# Patient Record
Sex: MALE | Race: BLACK OR AFRICAN AMERICAN | Employment: PART TIME | URBAN - METROPOLITAN AREA
[De-identification: names, ages, dates, MRNs, and addresses within clinical notes are randomized per-mention and may not be internally consistent; named-entity substitution may affect disease eponyms.]

---

## 2017-03-07 ENCOUNTER — OFFICE VISIT (OUTPATIENT)
Dept: HEMATOLOGY | Age: 62
End: 2017-03-07

## 2017-03-07 VITALS
WEIGHT: 219 LBS | RESPIRATION RATE: 14 BRPM | TEMPERATURE: 97.9 F | DIASTOLIC BLOOD PRESSURE: 59 MMHG | HEART RATE: 87 BPM | SYSTOLIC BLOOD PRESSURE: 99 MMHG | OXYGEN SATURATION: 97 % | HEIGHT: 75 IN | BODY MASS INDEX: 27.23 KG/M2

## 2017-03-07 DIAGNOSIS — B18.1 CHRONIC HEPATITIS B (HCC): Primary | ICD-10-CM

## 2017-03-07 DIAGNOSIS — B18.1 CHRONIC HEPATITIS B (HCC): ICD-10-CM

## 2017-03-07 NOTE — PROGRESS NOTES
93 Cassie Reeder MD, STAN Acosta NP Gladis Delay, NP        at [address], [address] [address]  22.     [phone]     FAX: [phone]    at [facility], [address] [place] [address]     [phone]     FAX: [phone]       Patient Care Team:  Bubba Almaguer MD as PCP - General (Family Practice)  Marissa Whitign MD (Gastroenterology)      Problem List  Date Reviewed: 8/29/2016          Codes Class Noted    Chronic hepatitis B ([facility] ICD-10-CM: B18.1  ICD-9-CM: 070.32  7/26/2014        Bipolar 1 disorder ([facility] ICD-10-CM: F31.9  ICD-9-CM: 296.7  7/21/2014        Chronic hepatitis C ([facility] ICD-10-CM: B18.2  ICD-9-CM: 070.54  7/21/2014        GERD (gastroesophageal reflux disease) ICD-10-CM: K21.9  ICD-9-CM: 530.81  7/21/2014        Colon polyps ICD-10-CM: K63.5  ICD-9-CM: 211.3  7/21/2014              Ghazal Kuo returns to the [address] for management of Chronic HCV and Chronic HBV. The active problem list, all pertinent past medical history, medications, liver histology, endoscopic studies, radiologic findings and laboratory findings related to the liver disorder were reviewed with the patient. A liver biopsy was performed in 5/2014. This demonstrated no fibrosis. The most recent laboratory studies indicate that the liver transaminases are normal, alkaline phosphatase is normal, tests of hepatic synthetic and metabolic function are normal, and the platelet count is normal.      Mr. Kyra Strong has chronic hepatitis B that is inactive with a low viral load and normal transaminases. Mr. Kyra Strong completed 12 weeks of treatment with Goldie Stage in May 2015. He tolerated treatment well and became HCV RNA undetectable at week eight.   He remained HCV RNA undetectable one year post treatment and is considered cured of HCV. Mr. Eliza Shaver has no physical complaints today. The patient completes all daily activities without any functional limitations. He denies headache, nausea, vomiting, myalgias, arthralgias, fever, abdominal pain, pruritus, icterus, swelling of the abdomen or lower extremities, or hematemesis or hematochezia. ALLERGIES  No Known Allergies    MEDICATIONS  Current Outpatient Prescriptions   Medication Sig    risperiDONE (RISPERDAL M-TABS) 1 mg disintegrating tablet Take 1 mg by mouth two (2) times a day.  lithium carbonate SR (LITHOBID) 300 mg CR tablet Take 300 mg by mouth two (2) times a day.  amitriptyline (ELAVIL) 25 mg tablet Take  by mouth nightly.  omeprazole (PRILOSEC) 40 mg capsule Take 40 mg by mouth daily. No current facility-administered medications for this visit. REVIEW OF SYSTEMS NOT RELATED TO LIVER DISEASE:  Constitution systems: Negative for fever, chills, weight gain, weight loss. Eyes: Negative for diplopia, visual changes, eye pain. ENT: Negative for sore throat, painful swallowing. Respiratory: Negative for cough, hemoptysis, dyspnea. Cardiology: Negative for chest pain, palpitations. GI:  Negative for constipation or diarrhea. : Negative for urinary frequency, dysuria and hematuria. Skin: Negative for rash. Hematology: Negative for easy bruising, bleeding from gums or nose. Musculo-skelatal: Negative for back pain, muscle pain, weakness. Neurologic: Negative for headaches, dizziness, vertigo, memory problems. Psychology: bipolar disease is well controlled. FAMILY HISTORY:  The father  of COPD. The mother  of ovarian cancer. There are no other persons in the family with HCV or liver disease. SOCIAL HISTORY:  The patient is . He is now in a long term relationship. The partner has not been tested for HCV. The patient has 1 child and 8 grandchildren. The patient currently smokes half pack of tobacco daily. The patient has never consumed significant amounts of alcohol. The patient currently works full time . PHYSICAL EXAMINATION:    Visit Vitals    BP 99/59 (BP 1 Location: Right arm, BP Patient Position: Sitting)    Pulse 87    Temp 97.9 °F (36.6 °C) (Tympanic)    Resp 14    Ht 6' 3\" (1.905 m)    Wt 219 lb (99.3 kg)    SpO2 97%    BMI 27.37 kg/m2       General: No acute distress. Eyes: Sclera anicteric. ENT: No oral lesions. Thyroid normal.  Nodes: No adenopathy. Skin: No spider angiomata. No jaundice. No palmar erythema. Respiratory: Lungs clear to auscultation. Cardiovascular: Regular heart rate. No murmurs. No JVD. Abdomen: Soft non-tender. Liver size normal to percussion/palpation. Spleen not palpable. No obvious ascites. Extremities: No edema. No muscle wasting. No gross arthritic changes. Neurologic: Alert and oriented. Cranial nerves grossly intact. No asterixsis. LABORATORY STUDIES:    Lab Results   Component Value Date/Time    WBC 3.7 08/29/2016 03:21 PM    HGB 14.1 08/29/2016 03:21 PM    HCT 43.1 08/29/2016 03:21 PM    PLATELET 653 75/05/6777 03:21 PM    MCV 89.6 08/29/2016 03:21 PM     Lab Results   Component Value Date/Time    Sodium 146 08/29/2016 03:21 PM    Potassium 4.6 08/29/2016 03:21 PM    Chloride 107 08/29/2016 03:21 PM    CO2 32 08/29/2016 03:21 PM    Anion gap 7 08/29/2016 03:21 PM    Glucose 108 08/29/2016 03:21 PM    BUN 11 08/29/2016 03:21 PM    Creatinine 1.24 08/29/2016 03:21 PM    BUN/Creatinine ratio 9 08/29/2016 03:21 PM    GFR est AA >60 08/29/2016 03:21 PM    GFR est non-AA 59 08/29/2016 03:21 PM    Calcium 9.2 08/29/2016 03:21 PM     Lab Results   Component Value Date/Time    ALT (SGPT) 18 08/29/2016 03:21 PM    AST (SGOT) 19 08/29/2016 03:21 PM    Alk. phosphatase 94 08/29/2016 03:21 PM    Bilirubin, direct 0.1 08/29/2016 03:21 PM    Bilirubin, total 0.4 08/29/2016 03:21 PM       HBV  IU    SEROLOGIES:  6/2014. HAV total positive, HBsAntigen positive, anti-HBsurface negative, anti-HCV positive, HCV Geneotype 1, HCV RNA Log 5.5 IU/ml, Ferritin 84, iron saturation 25%,   7/2014. HBsurface antigen positive, anti-HBcore positive, anti-HBsurface negative. LIVER HISTOLOGY:  6/2014. Mild-moderate inflammation with no fibrosis. Biopsy slides not reviewed by MLS. ENDOSCOPIC PROCEDURES:  2/2014. EGD by Dr Jabari Bruno. No varices. Schatzki ring. Normal stomach.  2/2014. Colonoscopy by Dr Jabari Bruno. Tubular adenoma. RADIOLOGY:  12/2014: Abdominal ultrasound. Normal appearing liver. No liver mass/lesion noted. 4/2016: Abdominal ultrasound. Normal appearing liver. No liver mass/lesion noted. 9/2016: Abdominal ultrasound. Normal appearing liver. No liver mass/lesion noted. OTHER TESTING:  Not available or performed    ASSESSMENT AND PLAN:    1. Chronic HCV, genotype 1. The liver transaminases are normal.  Liver function is normal.  The platelet count is normal.      2. HCV treatment. Mr. Davis completed 12 weeks of treatment with Alpesh Samm with good toleration in May 2015. He remained HCV RNA undetectable one year post treatment. CBC, BMP, hepatic panel, and HCV RNA ordered today. 3.  Chronic HBV, inactive. HBV DNA ordered today. His last HBV DNA was 310 IU. 4. The patient was directed to continue all current medications at the current dosages. 5. The patient was counseled regarding alcohol consumption. 6. Vaccination for viral hepatitis A is not required. The patient has serologic evidence of prior exposure or vaccination with immunity. 7. [facility] screening will be performed. Ultrasound and AFP ordered today. [facility] in 6 months. 9. All of the above issues were discussed with the patient. All questions were answered. The patient expressed a clear understanding of the above. 10. Dr. Elena Rose was available during this visit.      [facility] Duck, NP  Liver [place] of [address] [place] [address], [address] [address] Marisabel Hinkle, [address]  [phone]

## 2017-03-07 NOTE — MR AVS SNAPSHOT
Visit Information Date & Time Provider Department Dept. Phone Encounter #  
 3/7/2017  1:30 PM Jennifer Diop NP Liver [place] of [address] [place] [address] Street [id number] Follow-up Instructions Return in about 6 months (around 9/7/2017). Upcoming Health Maintenance Date Due Pneumococcal 19-64 Medium Risk (1 of 1 - PPSV23) 9/18/1974 DTaP/Tdap/Td series (1 - Tdap) 9/18/1976 FOBT Q 1 YEAR AGE 50-75 9/18/2005 ZOSTER VACCINE AGE 60> 9/18/2015 INFLUENZA AGE 9 TO ADULT 8/1/2016 Allergies as of 3/7/2017  Review Complete On: 3/7/2017 By: Kelsea Mantilla NP No Known Allergies Current Immunizations  Never Reviewed No immunizations on file. Not reviewed this visit You Were Diagnosed With   
  
 Codes Comments Chronic hepatitis B ([facility]    -  Primary ICD-10-CM: B18.1 ICD-9-CM: 070.32 Vitals BP Pulse Temp Resp Height(growth percentile) 99/59 (BP 1 Location: Right arm, BP Patient Position: Sitting) 87 97.9 °F (36.6 °C) (Tympanic) 14 6' 3\" (1.905 m) Weight(growth percentile) SpO2 BMI Smoking Status 219 lb (99.3 kg) 97% 27.37 kg/m2 Current Every Day Smoker BMI and BSA Data Body Mass Index Body Surface Area  
 27.37 kg/m 2 2.29 m 2 Preferred Pharmacy Pharmacy Name Phone [address] [place] [address] [phone] Your Updated Medication List  
  
   
This list is accurate as of: 3/7/17  2:09 PM.  Always use your most recent med list.  
  
  
  
  
 amitriptyline 25 mg tablet Commonly known as:  ELAVIL Take  by mouth nightly. lithium carbonate  mg CR tablet Commonly known as:  Kala Van Take 300 mg by mouth two (2) times a day. PriLOSEC 40 mg capsule Generic drug:  omeprazole Take 40 mg by mouth daily. risperiDONE 1 mg disintegrating tablet Commonly known as:  RisperDAL m-tabs Take 1 mg by mouth two (2) times a day. Follow-up Instructions Return in about 6 months (around 9/7/2017). To-Do List   
 03/07/2017 Lab:  AFP WITH AFP-L3%   
  
 03/07/2017 Lab:  CBC WITH AUTOMATED DIFF   
  
 03/07/2017 Lab:  HCV RNA BY RONNIE QL,RFLX TO QT   
  
 03/07/2017 Lab:  HEPATIC FUNCTION PANEL   
  
 03/07/2017 Lab:  HEPATITIS B, QT, PCR   
  
 03/07/2017 Lab:  METABOLIC PANEL, BASIC   
  
 03/07/2017 Imaging:  US [facility] SERVICES! Dear Rudi Kawasaki: Thank you for requesting a [employer] account. Our records indicate that you already have an active [employer] account. You can access your account anytime at [email or web address] Did you know that you can access your hospital and ER discharge instructions at any time in [employer]? You can also review all of your test results from your hospital stay or ER visit. Additional Information If you have questions, please visit the Frequently Asked Questions section of the Pro[employer] website at [email or web address] Remember, ProQu[employer] is NOT to be used for urgent needs. For medical emergencies, dial 911. Now available from your iPhone and Android! Please provide this summary of care documentation to your next provider. Your primary care clinician is listed as Hernán Bautista. If you have any questions after today's visit, please call [phone].

## 2017-03-14 ENCOUNTER — HOSPITAL ENCOUNTER (OUTPATIENT)
Dept: ULTRASOUND IMAGING | Age: 62
Discharge: HOME | End: 2017-03-14
Attending: NURSE PRACTITIONER
Payer: MEDICARE

## 2017-03-14 DIAGNOSIS — B18.1 CHRONIC HEPATITIS B (HCC): ICD-10-CM

## 2017-03-14 PROCEDURE — 76705 ECHO EXAM OF ABDOMEN: CPT

## 2017-03-31 LAB
AFP L3 MFR SERPL: NORMAL % (ref 0–9.9)
AFP SERPL-MCNC: 1.2 NG/ML (ref 0–8)
ALBUMIN SERPL-MCNC: 4.3 G/DL (ref 3.6–4.8)
ALP SERPL-CCNC: 80 IU/L (ref 39–117)
ALT SERPL-CCNC: 11 IU/L (ref 0–44)
AMBIG ABBREV BMP8 DEFAULT, 977205: NORMAL
AMBIG ABBREV HFP7 DEFAULT, 977213: NORMAL
AST SERPL-CCNC: 14 IU/L (ref 0–40)
BASOPHILS # BLD AUTO: 0 X10E3/UL (ref 0–0.2)
BASOPHILS NFR BLD AUTO: 0 %
BILIRUB DIRECT SERPL-MCNC: 0.19 MG/DL (ref 0–0.4)
BILIRUB SERPL-MCNC: 0.6 MG/DL (ref 0–1.2)
BUN SERPL-MCNC: 10 MG/DL (ref 8–27)
BUN/CREAT SERPL: 9 (ref 10–22)
CALCIUM SERPL-MCNC: 9.5 MG/DL (ref 8.6–10.2)
CHLORIDE SERPL-SCNC: 101 MMOL/L (ref 96–106)
CO2 SERPL-SCNC: 27 MMOL/L (ref 18–29)
CREAT SERPL-MCNC: 1.1 MG/DL (ref 0.76–1.27)
EOSINOPHIL # BLD AUTO: 0.1 X10E3/UL (ref 0–0.4)
EOSINOPHIL NFR BLD AUTO: 1 %
ERYTHROCYTE [DISTWIDTH] IN BLOOD BY AUTOMATED COUNT: 14.5 % (ref 12.3–15.4)
GLUCOSE SERPL-MCNC: 104 MG/DL (ref 65–99)
HCT VFR BLD AUTO: 41.2 % (ref 37.5–51)
HCV RNA SERPL QL NAA+PROBE: NEGATIVE
HGB BLD-MCNC: 14.3 G/DL (ref 12.6–17.7)
IMM GRANULOCYTES # BLD: 0 X10E3/UL (ref 0–0.1)
IMM GRANULOCYTES NFR BLD: 0 %
LYMPHOCYTES # BLD AUTO: 1.2 X10E3/UL (ref 0.7–3.1)
LYMPHOCYTES NFR BLD AUTO: 25 %
MCH RBC QN AUTO: 30.3 PG (ref 26.6–33)
MCHC RBC AUTO-ENTMCNC: 34.7 G/DL (ref 31.5–35.7)
MCV RBC AUTO: 87 FL (ref 79–97)
MONOCYTES # BLD AUTO: 0.3 X10E3/UL (ref 0.1–0.9)
MONOCYTES NFR BLD AUTO: 6 %
NEUTROPHILS # BLD AUTO: 3.2 X10E3/UL (ref 1.4–7)
NEUTROPHILS NFR BLD AUTO: 68 %
PLATELET # BLD AUTO: 197 X10E3/UL (ref 150–379)
POTASSIUM SERPL-SCNC: 4.2 MMOL/L (ref 3.5–5.2)
PROT SERPL-MCNC: 6.7 G/DL (ref 6–8.5)
RBC # BLD AUTO: 4.72 X10E6/UL (ref 4.14–5.8)
SODIUM SERPL-SCNC: 141 MMOL/L (ref 134–144)
WBC # BLD AUTO: 4.8 X10E3/UL (ref 3.4–10.8)

## 2017-12-06 ENCOUNTER — OFFICE VISIT (OUTPATIENT)
Dept: HEMATOLOGY | Age: 62
End: 2017-12-06

## 2017-12-06 ENCOUNTER — HOSPITAL ENCOUNTER (OUTPATIENT)
Dept: LAB | Age: 62
Discharge: HOME | End: 2017-12-06
Payer: MEDICARE

## 2017-12-06 VITALS
SYSTOLIC BLOOD PRESSURE: 114 MMHG | OXYGEN SATURATION: 97 % | BODY MASS INDEX: 26.36 KG/M2 | DIASTOLIC BLOOD PRESSURE: 70 MMHG | HEIGHT: 75 IN | WEIGHT: 212 LBS | HEART RATE: 78 BPM | RESPIRATION RATE: 16 BRPM | TEMPERATURE: 96.4 F

## 2017-12-06 DIAGNOSIS — B19.10 HEP B W/O COMA: ICD-10-CM

## 2017-12-06 DIAGNOSIS — B19.10 HEP B W/O COMA: Primary | ICD-10-CM

## 2017-12-06 LAB
ALBUMIN SERPL-MCNC: 4.6 G/DL (ref 3.4–5)
ALBUMIN/GLOB SERPL: 1.5 {RATIO} (ref 0.8–1.7)
ALP SERPL-CCNC: 77 U/L (ref 45–117)
ALT SERPL-CCNC: 23 U/L (ref 16–61)
ANION GAP SERPL CALC-SCNC: 9 MMOL/L (ref 3–18)
AST SERPL-CCNC: 24 U/L (ref 15–37)
BASOPHILS # BLD: 0 K/UL (ref 0–0.06)
BASOPHILS NFR BLD: 0 % (ref 0–2)
BILIRUB DIRECT SERPL-MCNC: 0.2 MG/DL (ref 0–0.2)
BILIRUB SERPL-MCNC: 0.8 MG/DL (ref 0.2–1)
BUN SERPL-MCNC: 16 MG/DL (ref 7–18)
BUN/CREAT SERPL: 12 (ref 12–20)
CALCIUM SERPL-MCNC: 10.4 MG/DL (ref 8.5–10.1)
CHLORIDE SERPL-SCNC: 108 MMOL/L (ref 100–108)
CO2 SERPL-SCNC: 32 MMOL/L (ref 21–32)
CREAT SERPL-MCNC: 1.3 MG/DL (ref 0.6–1.3)
DIFFERENTIAL METHOD BLD: NORMAL
EOSINOPHIL # BLD: 0 K/UL (ref 0–0.4)
EOSINOPHIL NFR BLD: 1 % (ref 0–5)
ERYTHROCYTE [DISTWIDTH] IN BLOOD BY AUTOMATED COUNT: 14.2 % (ref 11.6–14.5)
GLOBULIN SER CALC-MCNC: 3 G/DL (ref 2–4)
GLUCOSE SERPL-MCNC: 100 MG/DL (ref 74–99)
HCT VFR BLD AUTO: 45.8 % (ref 36–48)
HGB BLD-MCNC: 15.4 G/DL (ref 13–16)
LYMPHOCYTES # BLD: 1.5 K/UL (ref 0.9–3.6)
LYMPHOCYTES NFR BLD: 31 % (ref 21–52)
MCH RBC QN AUTO: 30.1 PG (ref 24–34)
MCHC RBC AUTO-ENTMCNC: 33.6 G/DL (ref 31–37)
MCV RBC AUTO: 89.5 FL (ref 74–97)
MONOCYTES # BLD: 0.3 K/UL (ref 0.05–1.2)
MONOCYTES NFR BLD: 6 % (ref 3–10)
NEUTS SEG # BLD: 3 K/UL (ref 1.8–8)
NEUTS SEG NFR BLD: 62 % (ref 40–73)
PLATELET # BLD AUTO: 197 K/UL (ref 135–420)
PMV BLD AUTO: 11.1 FL (ref 9.2–11.8)
POTASSIUM SERPL-SCNC: 4.9 MMOL/L (ref 3.5–5.5)
PROT SERPL-MCNC: 7.6 G/DL (ref 6.4–8.2)
RBC # BLD AUTO: 5.12 M/UL (ref 4.7–5.5)
SODIUM SERPL-SCNC: 149 MMOL/L (ref 136–145)
WBC # BLD AUTO: 4.8 K/UL (ref 4.6–13.2)

## 2017-12-06 PROCEDURE — 80048 BASIC METABOLIC PNL TOTAL CA: CPT | Performed by: NURSE PRACTITIONER

## 2017-12-06 PROCEDURE — 85025 COMPLETE CBC W/AUTO DIFF WBC: CPT | Performed by: NURSE PRACTITIONER

## 2017-12-06 PROCEDURE — 36415 COLL VENOUS BLD VENIPUNCTURE: CPT | Performed by: NURSE PRACTITIONER

## 2017-12-06 PROCEDURE — 87517 HEPATITIS B DNA QUANT: CPT | Performed by: NURSE PRACTITIONER

## 2017-12-06 PROCEDURE — 82107 ALPHA-FETOPROTEIN L3: CPT | Performed by: NURSE PRACTITIONER

## 2017-12-06 PROCEDURE — 80076 HEPATIC FUNCTION PANEL: CPT | Performed by: NURSE PRACTITIONER

## 2017-12-06 NOTE — MR AVS SNAPSHOT
Visit Information Date & Time Provider Department Dept. Phone Encounter #  
 12/6/2017 11:00 AM STAN [address] [place] [address] [phone] Follow-up Instructions Return in about 6 months (around 6/6/2018). Upcoming Health Maintenance Date Due Pneumococcal 19-64 Medium Risk (1 of 1 - PPSV23) 9/18/1974 DTaP/Tdap/Td series (1 - Tdap) 9/18/1976 FOBT Q 1 YEAR AGE 50-75 9/18/2005 ZOSTER VACCINE AGE 60> 7/18/2015 Influenza Age 5 to Adult 8/1/2017 Allergies as of 12/6/2017  Review Complete On: 12/6/2017 By: Ernie Clay No Known Allergies Current Immunizations  Never Reviewed No immunizations on file. Not reviewed this visit You Were Diagnosed With   
  
 Codes Comments Hep B w/o coma    -  Primary ICD-10-CM: B19.10 ICD-9-CM: 070.30 Vitals BP Pulse Temp Resp Height(growth percentile) 114/70 (BP 1 Location: Left arm, BP Patient Position: Sitting) 78 96.4 °F (35.8 °C) (Tympanic) 16 6' 3\" (1.905 m) Weight(growth percentile) SpO2 BMI Smoking Status 212 lb (96.2 kg) 97% 26.5 kg/m2 Current Every Day Smoker Vitals History BMI and BSA Data Body Mass Index Body Surface Area  
 26.5 kg/m 2 2.26 m 2 Preferred Pharmacy Pharmacy Name Phone [address] [facility] Marcela S, [address] [place] [address] [phone] Your Updated Medication List  
  
   
This list is accurate as of: 12/6/17 11:04 AM.  Always use your most recent med list.  
  
  
  
  
 amitriptyline 25 mg tablet Commonly known as:  ELAVIL Take  by mouth nightly. lithium carbonate  mg CR tablet Commonly known as:  Selestino Kelch Take 300 mg by mouth two (2) times a day. PriLOSEC 40 mg capsule Generic drug:  omeprazole Take 40 mg by mouth daily. risperiDONE 1 mg disintegrating tablet Commonly known as:  RisperDAL m-tabs Take 1 mg by mouth two (2) times a day. Follow-up Instructions Return in about 6 months (around 6/6/2018). To-Do List   
 12/06/2017 Lab:  AFP WITH AFP-L3%   
  
 12/06/2017 Lab:  CBC WITH AUTOMATED DIFF   
  
 12/06/2017 Lab:  HEPATIC FUNCTION PANEL   
  
 12/06/2017 Lab:  HEPATITIS B, QT, PCR   
  
 12/06/2017 Lab:  METABOLIC PANEL, BASIC   
  
 12/06/2017 Imaging:  [facility]BD [facility]LASTOGRAPHY Introducing [facility] SERVICES! Dear Beti Wise: Thank you for requesting a [employer] account. Our records indicate that you already have an active [employer] account. You can access your account anytime at [email or web address] Did you know that you can access your hospital and ER discharge instructions at any time in [employer]? You can also review all of your test results from your hospital stay or ER visit. Additional Information If you have questions, please visit the Frequently Asked Questions section of the [employer] website at [email or web address] Remember, SwapMob is NOT to be used for urgent needs. For medical emergencies, dial 911. Now available from your iPhone and Android! Please provide this summary of care documentation to your next provider. Your primary care clinician is listed as Tulio Isaacs. If you have any questions after today's visit, please call [phone].

## 2017-12-06 NOTE — PROGRESS NOTES
[address] Anatoly Uribe MD, Distant, [place]ye [place], [place]       STAN [place], SUZANNA Castano Officer, [facility]   STAN Greene NP        at [address], [address] [address]     [phone]     FAX: [phone]    at [address], [address] [place] [address], [address]     [phone]     FAX: [phone]         Patient Care Team:  Criselda Rachel MD as PCP - General (Family Practice)  Pascual Turcios MD (Gastroenterology)      Problem List  Date Reviewed: 12/6/2017          Codes Class Noted    Chronic hepatitis B ([facility] ICD-10-CM: B18.1  ICD-9-CM: 070.32  7/26/2014        Bipolar 1 disorder ([facility] ICD-10-CM: F31.9  ICD-9-CM: 296.7  7/21/2014        Chronic hepatitis C ([facility] ICD-10-CM: B18.2  ICD-9-CM: 070.54  7/21/2014        GERD (gastroesophageal reflux disease) ICD-10-CM: K21.9  ICD-9-CM: 530.81  7/21/2014        Colon polyps ICD-10-CM: K63.5  ICD-9-CM: 211.3  7/21/2014              Manju Heard [facility] the [facility] for management of Chronic HCV and Chronic HBV. The active problem list, all pertinent past medical history, medications, liver histology, endoscopic studies, radiologic findings and laboratory findings related to the liver disorder were reviewed with the patient. A liver biopsy was performed in 5/2014. This demonstrated no fibrosis. The most recent laboratory studies indicate that the liver transaminases are normal, alkaline phosphatase is normal, tests of hepatic synthetic and metabolic function are normal, and the platelet count is normal.      Mr. Jaleesa Muniz has chronic hepatitis B that is inactive with a low viral load and normal transaminases. Mr. Jaleesa Muniz completed 12 weeks of treatment with Daleen Calico in May 2015.   He tolerated treatment well and became HCV RNA undetectable at week eight. He remained HCV RNA undetectable one year post treatment. The HCV has been treated and cured. The patient has no complaints which can be attributed to liver disease. The patient completes all daily activities without any functional limitations. The patient has not experienced fatigue, fevers, chills, shortness of breath, chest pain, pain in the right side over the liver, diffuse abdominal pain, nausea, vomiting, constipation, diarrhrea, dry eyes, dry mouth, arthralgias, myalgias, yellowing of the eyes or skin, itching, dark urine, problems concentrating, swelling of the abdomen, swelling of the lower extremities, hematemesis, or hematochezia. ALLERGIES  No Known Allergies    MEDICATIONS  Current Outpatient Prescriptions   Medication Sig    risperiDONE (RISPERDAL M-TABS) 1 mg disintegrating tablet Take 1 mg by mouth two (2) times a day.  lithium carbonate SR (LITHOBID) 300 mg CR tablet Take 300 mg by mouth two (2) times a day.  amitriptyline (ELAVIL) 25 mg tablet Take  by mouth nightly.  omeprazole (PRILOSEC) 40 mg capsule Take 40 mg by mouth daily. No current facility-administered medications for this visit. REVIEW OF SYSTEMS NOT RELATED TO LIVER DISEASE:  Constitution systems: Negative for fever, chills, weight gain, weight loss. Eyes: Negative for diplopia, visual changes, eye pain. ENT: Negative for sore throat, painful swallowing. Respiratory: Negative for cough, hemoptysis, dyspnea. Cardiology: Negative for chest pain, palpitations. GI:  Negative for constipation or diarrhea. : Negative for urinary frequency, dysuria and hematuria. Skin: Negative for rash. Hematology: Negative for easy bruising, bleeding from gums or nose. Musculo-skelatal: Negative for back pain, muscle pain, weakness. Neurologic: Negative for headaches, dizziness, vertigo, memory problems. Psychology: bipolar disease is well controlled. FAMILY HISTORY:  The father  of COPD. The mother  of ovarian cancer. There are no other persons in the family with HCV or liver disease. SOCIAL HISTORY:  The patient is . He is now in a long term relationship. The partner has not been tested for HCV. The patient has 1 child and 8 grandchildren. The patient currently smokes half pack of tobacco daily. The patient has never consumed significant amounts of alcohol. The patient currently works full time . PHYSICAL EXAMINATION:    Visit Vitals    /70 (BP 1 Location: Left arm, BP Patient Position: Sitting)    Pulse 78    Temp 96.4 °F (35.8 °C) (Tympanic)    Resp 16    Ht 6' 3\" (1.905 m)    Wt 212 lb (96.2 kg)    SpO2 97%    BMI 26.5 kg/m2       General: No acute distress. Eyes: Sclera anicteric. ENT: No oral lesions. Thyroid normal.  Nodes: No adenopathy. Skin: No spider angiomata. No jaundice. No palmar erythema. Respiratory: Lungs clear to auscultation. Cardiovascular: Regular heart rate. No murmurs. No JVD. Abdomen: Soft non-tender. Liver size normal to percussion/palpation. Spleen not palpable. No obvious ascites. Extremities: No edema. No muscle wasting. No gross arthritic changes. Neurologic: Alert and oriented. Cranial nerves grossly intact. No asterixsis.     LABORATORY STUDIES:  Liver [place] of [address] [place] [address] 2017 3/28/2017   WBC 4.6 - 13.2 K/uL 4.8 4.8   ANC 1.8 - 8.0 K/UL 3.0 3.2   HGB 13.0 - 16.0 g/dL 15.4 14.3    - 420 K/uL 197 197   INR 0.8 - 1.2       AST 15 - 37 U/L 24 14   ALT 16 - 61 U/L 23 11   Alk Phos 45 - 117 U/L 77 80   Bili, Total 0.2 - 1.0 MG/DL 0.8 0.6   Bili, Direct 0.0 - 0.2 MG/DL 0.2 0.19   Albumin 3.4 - 5.0 g/dL 4.6 4.3   BUN 7.0 - 18 MG/DL 16 10   Creat 0.6 - 1.3 MG/DL 1.30 1.10   Na 136 - 145 mmol/L 149 (H) 141   K 3.5 - 5.5 mmol/L 4.9 4.2   Cl 100 - 108 mmol/L 108 101   CO2 21 - 32 mmol/L 32 27   Glucose 74 - 99 mg/dL 100 (H) 104 (H)     Liver [facility] Units 8/29/2016   WBC 4.6 - 13.2 K/uL 3.7 (L)   ANC 1.8 - 8.0 K/UL 2.2   HGB 13.0 - 16.0 g/dL 14.1    - 420 K/uL 190   INR 0.8 - 1.2   1.0   AST 15 - 37 U/L 19   ALT 16 - 61 U/L 18   Alk Phos 45 - 117 U/L 94   Bili, Total 0.2 - 1.0 MG/DL 0.4   Bili, Direct 0.0 - 0.2 MG/DL 0.1   Albumin 3.4 - 5.0 g/dL 4.1   BUN 7.0 - 18 MG/DL 11   Creat 0.6 - 1.3 MG/DL 1.24   Na 136 - 145 mmol/L 146 (H)   K 3.5 - 5.5 mmol/L 4.6   Cl 100 - 108 mmol/L 107   CO2 21 - 32 mmol/L 32   Glucose 74 - 99 mg/dL 108 (H)       SEROLOGIES:  6/2014. HAV total positive, HBsAntigen positive, anti-HBsurface negative, anti-HCV positive, HCV Geneotype 1, HCV RNA Log 5.5 IU/ml, Ferritin 84, iron saturation 25%,   7/2014. HBsurface antigen positive, anti-HBcore positive, anti-HBsurface negative. LIVER HISTOLOGY:  6/2014. Mild-moderate inflammation with no fibrosis. Biopsy slides not reviewed by MLS. ENDOSCOPIC PROCEDURES:  2/2014. EGD by Dr Shanna Obando. No varices. Schatzki ring. Normal stomach.  2/2014. Colonoscopy by Dr Shanna Obando. Tubular adenoma. RADIOLOGY:  12/2014: Abdominal ultrasound. Normal appearing liver. No liver mass/lesion noted. 4/2016: Abdominal ultrasound. Normal appearing liver. No liver mass/lesion noted. 9/2016: Abdominal ultrasound. Normal appearing liver. No liver mass/lesion noted. 03/2017. Ultrasound of the liver. Mild hepatomegaly and increased hepatic echogenicity, most suggestive of hepatic steatosis. No suspicious hepatic lesion. OTHER TESTING:  Not available or performed    ASSESSMENT AND PLAN:  Chronic HCV, genotype 1. The most recent laboratory studies indicate that the liver transaminases are normal, alkaline phosphatase is normal, tests of hepatic synthetic and metabolic function are normal, and the platelet count is normal.  Will perform laboratory testing to monitor liver function and degree of liver injury.  This will include hepatic panel, a CBC w/ diff, a BMP, an HBV DNA, an AFP-L3%, and an HCV RNA. HCV treatment. Mr. Shola Mason completed 12 weeks of treatment with Ella Siddiqui in May 2015. He remained HCV RNA undetectable one year post treatment. The HCV has been treated and cured. Inactive chronic HBV. No treatment is required at this time. Inactive disease does not need treatment. [address] screening. Recent ultrasound did not suggest emerging HCC. I emphasized to the patient that he need to have ultrasounds completed every 6 months. Early detection of HCC is the key to survival.  Even though he has inactive disease, he is still at increased risk for developing [address] The need to perform an assessment of liver fibrosis was discussed with the patient. Elastography  can assess liver fibrosis and determine if a patient has advanced fibrosis or cirrhosis without the need for liver biopsy. This can also be performed with ultrasound. This was ordered today. The patient was directed to continue all current medications at the current dosages. The patient was counseled regarding alcohol consumption. Vaccination for viral hepatitis A is not required. The patient has serologic evidence of prior exposure or vaccination with immunity. [address] [place] [address] in 6 months. All of the above issues were discussed with the patient. All questions were answered. The patient expressed a clear understanding of the above. Dr. Meet Neff was available during this visit.      Mirian Toscano NP  Liver [place] of [address] [place] [address], [address] [place] [address], [address]  [phone]

## 2017-12-06 NOTE — PROGRESS NOTES
1. Have you been to the ER, urgent care clinic since your last visit? Hospitalized since your last visit? No    2. Have you seen or consulted any other health care providers outside of the [address] [place] [address] since your last visit? Include any pap smears or colon screening.  No

## 2017-12-07 LAB
HBV DNA SERPL NAA+PROBE-ACNC: 20 IU/ML
HBV DNA SERPL NAA+PROBE-LOG IU: 1.3 LOG10IU/ML
TEST INFORMATION: NORMAL

## 2017-12-08 LAB
AFP L3 MFR SERPL: 24.5 % (ref 0–9.9)
AFP SERPL-MCNC: 2.7 NG/ML (ref 0–8)

## 2017-12-11 ENCOUNTER — TELEPHONE (OUTPATIENT)
Dept: HEMATOLOGY | Age: 62
End: 2017-12-11

## 2017-12-23 ENCOUNTER — HOSPITAL ENCOUNTER (OUTPATIENT)
Dept: ULTRASOUND IMAGING | Age: 62
Discharge: HOME | End: 2017-12-23
Payer: MEDICARE

## 2017-12-23 DIAGNOSIS — B19.10 HEP B W/O COMA: ICD-10-CM

## 2017-12-23 PROCEDURE — 0346T US ABD LTD W ELASTOGRAPHY: CPT

## 2018-01-09 NOTE — PROGRESS NOTES
Please let the patient know that the ultrasound was unremarkable. No liver masses. Elastography suggests some moderate hepatic fibrosis and fatty liver. Encourage weight loss. Thank you.

## 2018-07-31 ENCOUNTER — HOSPITAL ENCOUNTER (OUTPATIENT)
Dept: LAB | Age: 63
Discharge: HOME | End: 2018-07-31
Payer: MEDICARE

## 2018-07-31 ENCOUNTER — OFFICE VISIT (OUTPATIENT)
Dept: HEMATOLOGY | Age: 63
End: 2018-07-31

## 2018-07-31 VITALS
OXYGEN SATURATION: 98 % | RESPIRATION RATE: 16 BRPM | SYSTOLIC BLOOD PRESSURE: 123 MMHG | WEIGHT: 199 LBS | HEART RATE: 82 BPM | TEMPERATURE: 97.9 F | HEIGHT: 75 IN | DIASTOLIC BLOOD PRESSURE: 68 MMHG | BODY MASS INDEX: 24.74 KG/M2

## 2018-07-31 DIAGNOSIS — B19.10 HEP B W/O COMA: ICD-10-CM

## 2018-07-31 DIAGNOSIS — B19.10 HEP B W/O COMA: Primary | ICD-10-CM

## 2018-07-31 LAB
ALBUMIN SERPL-MCNC: 4.2 G/DL (ref 3.4–5)
ALBUMIN/GLOB SERPL: 1.4 {RATIO} (ref 0.8–1.7)
ALP SERPL-CCNC: 74 U/L (ref 45–117)
ALT SERPL-CCNC: 17 U/L (ref 16–61)
ANION GAP SERPL CALC-SCNC: 4 MMOL/L (ref 3–18)
AST SERPL-CCNC: 22 U/L (ref 15–37)
BASOPHILS # BLD: 0 K/UL (ref 0–0.1)
BASOPHILS NFR BLD: 0 % (ref 0–2)
BILIRUB DIRECT SERPL-MCNC: 0.2 MG/DL (ref 0–0.2)
BILIRUB SERPL-MCNC: 0.5 MG/DL (ref 0.2–1)
BUN SERPL-MCNC: 14 MG/DL (ref 7–18)
BUN/CREAT SERPL: 12 (ref 12–20)
CALCIUM SERPL-MCNC: 9.6 MG/DL (ref 8.5–10.1)
CHLORIDE SERPL-SCNC: 106 MMOL/L (ref 100–108)
CO2 SERPL-SCNC: 32 MMOL/L (ref 21–32)
CREAT SERPL-MCNC: 1.2 MG/DL (ref 0.6–1.3)
DIFFERENTIAL METHOD BLD: ABNORMAL
EOSINOPHIL # BLD: 0 K/UL (ref 0–0.4)
EOSINOPHIL NFR BLD: 0 % (ref 0–5)
ERYTHROCYTE [DISTWIDTH] IN BLOOD BY AUTOMATED COUNT: 13.9 % (ref 11.6–14.5)
GLOBULIN SER CALC-MCNC: 3 G/DL (ref 2–4)
GLUCOSE SERPL-MCNC: 99 MG/DL (ref 74–99)
HCT VFR BLD AUTO: 43.5 % (ref 36–48)
HGB BLD-MCNC: 14.5 G/DL (ref 13–16)
LYMPHOCYTES # BLD: 1.2 K/UL (ref 0.9–3.6)
LYMPHOCYTES NFR BLD: 32 % (ref 21–52)
MCH RBC QN AUTO: 30.2 PG (ref 24–34)
MCHC RBC AUTO-ENTMCNC: 33.3 G/DL (ref 31–37)
MCV RBC AUTO: 90.6 FL (ref 74–97)
MONOCYTES # BLD: 0.2 K/UL (ref 0.05–1.2)
MONOCYTES NFR BLD: 6 % (ref 3–10)
NEUTS SEG # BLD: 2.3 K/UL (ref 1.8–8)
NEUTS SEG NFR BLD: 62 % (ref 40–73)
PLATELET # BLD AUTO: 159 K/UL (ref 135–420)
PMV BLD AUTO: 11.4 FL (ref 9.2–11.8)
POTASSIUM SERPL-SCNC: 4.7 MMOL/L (ref 3.5–5.5)
PROT SERPL-MCNC: 7.2 G/DL (ref 6.4–8.2)
RBC # BLD AUTO: 4.8 M/UL (ref 4.7–5.5)
SODIUM SERPL-SCNC: 142 MMOL/L (ref 136–145)
WBC # BLD AUTO: 3.8 K/UL (ref 4.6–13.2)

## 2018-07-31 PROCEDURE — 80048 BASIC METABOLIC PNL TOTAL CA: CPT | Performed by: NURSE PRACTITIONER

## 2018-07-31 PROCEDURE — 80076 HEPATIC FUNCTION PANEL: CPT | Performed by: NURSE PRACTITIONER

## 2018-07-31 PROCEDURE — 85025 COMPLETE CBC W/AUTO DIFF WBC: CPT | Performed by: NURSE PRACTITIONER

## 2018-07-31 PROCEDURE — 82107 ALPHA-FETOPROTEIN L3: CPT | Performed by: NURSE PRACTITIONER

## 2018-07-31 PROCEDURE — 87521 HEPATITIS C PROBE&RVRS TRNSC: CPT | Performed by: NURSE PRACTITIONER

## 2018-07-31 PROCEDURE — 87517 HEPATITIS B DNA QUANT: CPT | Performed by: NURSE PRACTITIONER

## 2018-07-31 PROCEDURE — 36415 COLL VENOUS BLD VENIPUNCTURE: CPT | Performed by: NURSE PRACTITIONER

## 2018-07-31 NOTE — PROGRESS NOTES
[address] Anatoly Uribe MD, [address], [place], [place]       Yasmeen Gonzales, SUZANNA Feliz, DERIC-BC   Maine Age, NP    Kristian Bowles NP        at [place] [id number], [address] [address]     [phone]     FAX: [phone]    at [address], [address]     [phone]     FAX: [phone]         Patient Care Team:  Karen Landon MD as PCP - General (Family Practice)  Eric Hudson MD (Gastroenterology)      Problem List  Date Reviewed: 7/31/2018          Codes Class Noted    Chronic hepatitis B ([facility] ICD-10-CM: B18.1  ICD-9-CM: 070.32  7/26/2014        Bipolar 1 disorder ([facility] ICD-10-CM: F31.9  ICD-9-CM: 296.7  7/21/2014        Chronic hepatitis C ([facility] ICD-10-CM: B18.2  ICD-9-CM: 070.54  7/21/2014        GERD (gastroesophageal reflux disease) ICD-10-CM: K21.9  ICD-9-CM: 530.81  7/21/2014        Colon polyps ICD-10-CM: K63.5  ICD-9-CM: 211.3  7/21/2014              Miko Brandon [facility] the [facility] for management of Chronic HCV and Chronic HBV. The active problem list, all pertinent past medical history, medications, liver histology, endoscopic studies, radiologic findings and laboratory findings related to the liver disorder were reviewed with the patient. A liver biopsy was performed in 5/2014. This demonstrated no fibrosis. The most recent laboratory studies indicate that the liver transaminases are normal, alkaline phosphatase is normal, tests of hepatic synthetic and metabolic function are normal, and the platelet count is normal.      Mr. Shahida Guerrero has chronic hepatitis B that is inactive with a low viral load and normal transaminases. Mr. Shahida Guerrero completed 12 weeks of treatment with Clydie Seamen in May 2015.   He tolerated treatment well and became HCV RNA undetectable at week eight. He remained HCV RNA undetectable one year post treatment. The HCV has been treated and cured. The patient has no complaints which can be attributed to liver disease. The patient completes all daily activities without any functional limitations. The patient has not experienced fatigue, fevers, chills, shortness of breath, chest pain, pain in the right side over the liver, diffuse abdominal pain, nausea, vomiting, constipation, diarrhrea, dry eyes, dry mouth, arthralgias, myalgias, yellowing of the eyes or skin, itching, dark urine, problems concentrating, swelling of the abdomen, swelling of the lower extremities, hematemesis, or hematochezia. ALLERGIES  No Known Allergies    MEDICATIONS  Current Outpatient Prescriptions   Medication Sig    risperiDONE (RISPERDAL M-TABS) 1 mg disintegrating tablet Take 1 mg by mouth two (2) times a day.  lithium carbonate SR (LITHOBID) 300 mg CR tablet Take 300 mg by mouth two (2) times a day.  amitriptyline (ELAVIL) 25 mg tablet Take  by mouth nightly.  omeprazole (PRILOSEC) 40 mg capsule Take 40 mg by mouth daily. No current facility-administered medications for this visit. REVIEW OF SYSTEMS NOT RELATED TO LIVER DISEASE:  Constitution systems: Negative for fever, chills, weight gain, weight loss. Eyes: Negative for diplopia, visual changes, eye pain. ENT: Negative for sore throat, painful swallowing. Respiratory: Negative for cough, hemoptysis, dyspnea. Cardiology: Negative for chest pain, palpitations. GI:  Negative for constipation or diarrhea. : Negative for urinary frequency, dysuria and hematuria. Skin: Negative for rash. Hematology: Negative for easy bruising, bleeding from gums or nose. Musculo-skelatal: Negative for back pain, muscle pain, weakness. Neurologic: Negative for headaches, dizziness, vertigo, memory problems. Psychology: bipolar disease is well controlled. FAMILY HISTORY:  The father  of COPD. The mother  of ovarian cancer. There are no other persons in the family with HCV or liver disease. SOCIAL HISTORY:  The patient is . He is now in a long term relationship. The partner has not been tested for HCV. The patient has 1 child and 8 grandchildren. The patient currently smokes half pack of tobacco daily. The patient has never consumed significant amounts of alcohol. The patient currently works full time . PHYSICAL EXAMINATION:  Visit Vitals    /68 (BP 1 Location: Left arm, BP Patient Position: Sitting)    Pulse 82    Temp 97.9 °F (36.6 °C) (Tympanic)    Resp 16    Ht 6' 3\" (1.905 m)    Wt 199 lb (90.3 kg)    SpO2 98%    BMI 24.87 kg/m2       General: No acute distress. Eyes: Sclera anicteric. ENT: No oral lesions. Thyroid normal.  Nodes: No adenopathy. Skin: No spider angiomata. No jaundice. No palmar erythema. Respiratory: Lungs clear to auscultation. Cardiovascular: Regular heart rate. No murmurs. No JVD. Abdomen: Soft non-tender. Liver size normal to percussion/palpation. Spleen not palpable. No obvious ascites. Extremities: No edema. No muscle wasting. No gross arthritic changes. Neurologic: Alert and oriented. Cranial nerves grossly intact. No asterixsis.     LABORATORY STUDIES:  [facility] [place] [facility] [address] Units 2018   WBC 4.6 - 13.2 K/uL 3.8 (L) 4.8   ANC 1.8 - 8.0 K/UL 2.3 3.0   HGB 13.0 - 16.0 g/dL 14.5 15.4    - 420 K/uL 159 197   INR 0.8 - 1.2       AST 15 - 37 U/L 22 24   ALT 16 - 61 U/L 17 23   Alk Phos 45 - 117 U/L 74 77   Bili, Total 0.2 - 1.0 MG/DL 0.5 0.8   Bili, Direct 0.0 - 0.2 MG/DL 0.2 0.2   Albumin 3.4 - 5.0 g/dL 4.2 4.6   BUN 7.0 - 18 MG/DL 14 16   Creat 0.6 - 1.3 MG/DL 1.20 1.30   Na 136 - 145 mmol/L 142 149 (H)   K 3.5 - 5.5 mmol/L 4.7 4.9   Cl 100 - 108 mmol/L 106 108   CO2 21 - 32 mmol/L 32 32   Glucose 74 - 99 mg/dL 99 100 (H)     [facility] [place] [facility] [address] [place] [address] Ref Rng & Units 3/28/2017   WBC 4.6 - 13.2 K/uL 4.8   ANC 1.8 - 8.0 K/UL 3.2   HGB 13.0 - 16.0 g/dL 14.3    - 420 K/uL 197   INR 0.8 - 1.2      AST 15 - 37 U/L 14   ALT 16 - 61 U/L 11   Alk Phos 45 - 117 U/L 80   Bili, Total 0.2 - 1.0 MG/DL 0.6   Bili, Direct 0.0 - 0.2 MG/DL 0.19   Albumin 3.4 - 5.0 g/dL 4.3   BUN 7.0 - 18 MG/DL 10   Creat 0.6 - 1.3 MG/DL 1.10   Na 136 - 145 mmol/L 141   K 3.5 - 5.5 mmol/L 4.2   Cl 100 - 108 mmol/L 101   CO2 21 - 32 mmol/L 27   Glucose 74 - 99 mg/dL 104 (H)     Cancer Screening Latest Ref Rng & Units 7/31/2018 12/6/2017 3/28/2017   AFP, Serum 0.0 - 8.0 ng/mL 1.0 2.7 1.2   AFP-L3% 0.0 - 9.9 % Comment 24.5 (H) Comment     SEROLOGIES:  6/2014. HAV total positive, HBsAntigen positive, anti-HBsurface negative, anti-HCV positive, HCV Geneotype 1, HCV RNA Log 5.5 IU/ml, Ferritin 84, iron saturation 25%,   7/2014. HBsurface antigen positive, anti-HBcore positive, anti-HBsurface negative. LIVER HISTOLOGY:  6/2014. Mild-moderate inflammation with no fibrosis. Biopsy slides not reviewed by MLS. 12/2017. TRANSIENT HEPATIC ELASTOGRAPHY:   E Range: 6.70-11.42 kPa  E Mean: 9.28 kPa  E Median: 9.79 kPa  E Std: 2.31 kPa    ENDOSCOPIC PROCEDURES:  2/2014. EGD by Dr Henna Guerrero. No varices. Schatzki ring. Normal stomach.  2/2014. Colonoscopy by Dr Henna Guerrero. Tubular adenoma. RADIOLOGY:  12/2014: Abdominal ultrasound. Normal appearing liver. No liver mass/lesion noted. 4/2016: Abdominal ultrasound. Normal appearing liver. No liver mass/lesion noted. 9/2016: Abdominal ultrasound. Normal appearing liver. No liver mass/lesion noted. 03/2017. Ultrasound of the liver. Mild hepatomegaly and increased hepatic echogenicity, most suggestive of hepatic steatosis. No suspicious hepatic lesion. 12/2017. Ultrasound of the liver. The liver has mildly increased echotexture diffusely.  Hepatomegaly is present with estimated sagittal dimension of the right hepatic lobe measuring 18.1 cm. No focal hepatic lesions are seen. OTHER TESTING:  Not available or performed    ASSESSMENT AND PLAN:  Inactive Hepatitis B. The most recent laboratory studies indicate that the liver transaminases are normal, alkaline phosphatase is normal, tests of hepatic synthetic and metabolic function are normal, and the platelet count is normal.  Will perform laboratory testing to monitor liver function and degree of liver injury. This will include hepatic panel, a CBC w/ diff, a BMP, an HBV DNA, an AFP-L3%, and an HCV RNA. HCV treatment. Genotype. Mr. Willard Gale completed 12 weeks of treatment with Fernando Liter in May 2015. He remained HCV RNA undetectable one year post treatment. The HCV has been treated and cured. Inactive chronic HBV. No treatment is required at this time. Inactive disease does not need treatment. [address] screening. Recent ultrasound did not suggest emerging HCC. I emphasized to the patient that he need to have ultrasounds completed every 6 months. Early detection of HCC is the key to survival.  Even though he has inactive disease, he is still at increased risk for developing [address] Repeat ultrasound was ordered today. The patient was directed to continue all current medications at the current dosages. The patient was counseled regarding alcohol consumption. Vaccination for viral hepatitis A is not required. The patient has serologic evidence of prior exposure or vaccination with immunity. [facility] 87 in 6 months. All of the above issues were discussed with the patient. All questions were answered. The patient expressed a clear understanding of the above.      Kiesha White NP  [facility] [place] of [address] [place] [address], [address] [place] [address] Christine Clark, [address]  [phone]

## 2018-07-31 NOTE — PROGRESS NOTES
Miko Brandon is a 58 y.o. male      1. Have you been to the ER, urgent care clinic or hospitalized since your last visit? NO.     2. Have you seen or consulted any other health care providers outside of the [address] [place] [address] since your last visit (Include any pap smears or colon screening)?  NO          Learning Assessment 1/22/2015   PRIMARY LEARNER Patient   PRIMARY LANGUAGE ENGLISH   LEARNER PREFERENCE PRIMARY LISTENING   ANSWERED BY patient

## 2018-07-31 NOTE — MR AVS SNAPSHOT
[address] [place] [address] [address] [place] [address] 
[phone] Patient: Kevin Faye MRN: [id number] SFY:[id number] Visit Information Date & Time Provider Department Dept. Phone Encounter #  
 7/31/2018 11:30 AM STAN [address] of  [address] [id number] Follow-up Instructions Return in about 6 months (around 1/31/2019). Upcoming Health Maintenance Date Due Pneumococcal 19-64 Medium Risk (1 of 1 - PPSV23) 9/18/1974 DTaP/Tdap/Td series (1 - Tdap) 9/18/1976 FOBT Q 1 YEAR AGE 50-75 9/18/2005 ZOSTER VACCINE AGE 60> 7/18/2015 MEDICARE YEARLY EXAM 3/14/2018 Influenza Age 5 to Adult 8/1/2018 Allergies as of 7/31/2018  Review Complete On: 7/31/2018 By: Richardson Castanon No Known Allergies Current Immunizations  Never Reviewed No immunizations on file. Not reviewed this visit You Were Diagnosed With   
  
 Codes Comments Hep B w/o coma    -  Primary ICD-10-CM: B19.10 ICD-9-CM: 070.30 Vitals BP Pulse Temp Resp Height(growth percentile) 123/68 (BP 1 Location: Left arm, BP Patient Position: Sitting) 82 97.9 °F (36.6 °C) (Tympanic) 16 6' 3\" (1.905 m) Weight(growth percentile) SpO2 BMI Smoking Status 199 lb (90.3 kg) 98% 24.87 kg/m2 Current Every Day Smoker BMI and BSA Data Body Mass Index Body Surface Area  
 24.87 kg/m 2 2.19 m 2 Preferred Pharmacy Pharmacy Name Phone  [facility], [address] [place] [address] [phone] Your Updated Medication List  
  
   
This list is accurate as of 7/31/18 11:56 AM.  Always use your most recent med list.  
  
  
  
  
 amitriptyline 25 mg tablet Commonly known as:  ELAVIL Take  by mouth nightly. lithium carbonate  mg CR tablet Commonly known as:  [place] Mis Take 300 mg by mouth two (2) times a day. PriLOSEC 40 mg capsule Generic drug:  omeprazole Take 40 mg by mouth daily. risperiDONE 1 mg disintegrating tablet Commonly known as:  RisperDAL m-tabs Take 1 mg by mouth two (2) times a day. Follow-up Instructions Return in about 6 months (around 1/31/2019). To-Do List   
 07/31/2018 Lab:  AFP WITH AFP-L3%   
  
 07/31/2018 Lab:  CBC WITH AUTOMATED DIFF   
  
 07/31/2018 Lab:  HCV RNA BY RONNIE QL,RFLX TO QT   
  
 07/31/2018 Lab:  HEPATIC FUNCTION PANEL   
  
 07/31/2018 Lab:  HEPATITIS B, QT, PCR   
  
 07/31/2018 Lab:  METABOLIC PANEL, BASIC   
  
 07/31/2018 Imaging:  [facility]! Dear Gray Simmonds: Thank you for requesting a [employer] account. Our records indicate that you already have an active [employer] account. You can access your account anytime at [email or web address]. [employer] Did you know that you can access your hospital and ER discharge instructions at any time in [employer]? You can also review all of your test results from your hospital stay or ER visit. Additional Information If you have questions, please visit the Frequently Asked Questions section of the [employer] website at [email or web address]/. Remember, TapCanvas is NOT to be used for urgent needs. For medical emergencies, dial 911. Now available from your iPhone and Android! Please provide this summary of care documentation to your next provider. Your primary care clinician is listed as Michelle Rachel. If you have any questions after today's visit, please call [phone].

## 2018-08-01 LAB
AFP L3 MFR SERPL: NORMAL % (ref 0–9.9)
AFP SERPL-MCNC: 1 NG/ML (ref 0–8)
HBV DNA SERPL NAA+PROBE-ACNC: <10 IU/ML
HBV DNA SERPL NAA+PROBE-LOG IU: NORMAL LOG10 IU/ML
TEST INFORMATION: NORMAL

## 2018-08-03 LAB
HCV RNA SERPL NAA+PROBE-LOG IU: NOT DETECTED HCV LOG 10IU/ML
HCV RNA SERPL PROBE AMP-ACNC: NOT DETECTED HCVIU/ML
HCV RNA SERPL QL NAA+PROBE: NOT DETECTED

## 2018-08-16 ENCOUNTER — HOSPITAL ENCOUNTER (OUTPATIENT)
Dept: ULTRASOUND IMAGING | Age: 63
Discharge: HOME | End: 2018-08-16
Payer: MEDICARE

## 2018-08-16 DIAGNOSIS — B19.10 HEP B W/O COMA: ICD-10-CM

## 2018-08-16 PROCEDURE — 76705 ECHO EXAM OF ABDOMEN: CPT

## 2018-08-20 NOTE — PROGRESS NOTES
Please let him know that there is nothing suspicious on his ultrasound. No hepatic masses. Thank you.

## 2018-08-28 ENCOUNTER — OFFICE VISIT (OUTPATIENT)
Dept: FAMILY MEDICINE CLINIC | Age: 63
End: 2018-08-28

## 2018-08-28 VITALS
TEMPERATURE: 98.5 F | BODY MASS INDEX: 24.37 KG/M2 | DIASTOLIC BLOOD PRESSURE: 60 MMHG | HEIGHT: 75 IN | WEIGHT: 196 LBS | SYSTOLIC BLOOD PRESSURE: 110 MMHG | RESPIRATION RATE: 16 BRPM | HEART RATE: 85 BPM | OXYGEN SATURATION: 99 %

## 2018-08-28 DIAGNOSIS — R42 DIZZINESS: Primary | ICD-10-CM

## 2018-08-28 DIAGNOSIS — Z00.00 MEDICARE ANNUAL WELLNESS VISIT, INITIAL: ICD-10-CM

## 2018-08-28 RX ORDER — MECLIZINE HYDROCHLORIDE 25 MG/1
25 TABLET ORAL
Qty: 30 TAB | Refills: 0 | Status: SHIPPED | OUTPATIENT
Start: 2018-08-28 | End: 2018-09-07

## 2018-08-28 RX ORDER — BENZTROPINE MESYLATE 0.5 MG/1
0.5 TABLET ORAL 2 TIMES DAILY
COMMUNITY
End: 2019-05-23

## 2018-08-28 NOTE — MR AVS SNAPSHOT
303 [facility] 
 
 
 120 [facility] 114 [facility] [address] 
[phone] Patient: Yamini Devlin MRN: [id number] PGJ:5/95/3107 Visit Information Date & Time Provider Department Dept. Phone Encounter #  
 8/28/2018  8:15 AM CAREY Whitman [facility] [place] [phone] [id number] Your Appointments 1/29/2019 11:00 AM  
[place] [address] [facility] Appt Note: 6mnth f/u  
 [address], [place] 313 [facility] 322 [facility]  
  
   
 [address] [facility] [address], [address] [place] [address] Upcoming Health Maintenance Date Due Pneumococcal 19-64 Medium Risk (1 of 1 - PPSV23) 9/18/1974 DTaP/Tdap/Td series (1 - Tdap) 9/18/1976 FOBT Q 1 YEAR AGE 50-75 9/18/2005 ZOSTER VACCINE AGE 60> 7/18/2015 MEDICARE YEARLY EXAM 3/14/2018 Influenza Age 5 to Adult 8/1/2018 Allergies as of 8/28/2018  Review Complete On: 8/28/2018 By: Erin Espinoza LPN No Known Allergies Current Immunizations  Never Reviewed No immunizations on file. Not reviewed this visit You Were Diagnosed With   
  
 Codes Comments Dizziness    -  Primary ICD-10-CM: B58 ICD-9-CM: 780.4 Medicare annual wellness visit, initial     ICD-10-CM: Z00.00 ICD-9-CM: V70.0 Vitals BP Pulse Temp Resp Height(growth percentile) Weight(growth percentile) 110/60 (BP 1 Location: Left arm, BP Patient Position: Sitting) 85 98.5 °F (36.9 °C) (Oral) 16 6' 3\" (1.905 m) 196 lb (88.9 kg) SpO2 BMI Smoking Status 99% 24.5 kg/m2 Current Every Day Smoker Vitals History BMI and BSA Data Body Mass Index Body Surface Area 24.5 kg/m 2 2.17 m 2 Preferred Pharmacy Pharmacy Name Phone  [facility] [phone] Your Updated Medication List  
  
   
This list is accurate as of 8/28/18  8:50 AM.  Always use your most recent med list.  
  
  
  
  
 amitriptyline 25 mg tablet Commonly known as:  ELAVIL Take  by mouth nightly. benztropine 0.5 mg tablet Commonly known as:  COGENTIN Take 0.5 mg by mouth two (2) times a day. lithium carbonate  mg CR tablet Commonly known as:  Judeth Joss Take 300 mg by mouth two (2) times a day. meclizine 25 mg tablet Commonly known as:  ANTIVERT Take 1 Tab by mouth three (3) times daily as needed for up to 10 days. risperiDONE 1 mg disintegrating tablet Commonly known as:  RisperDAL m-tabs Take 1 mg by mouth two (2) times a day. Prescriptions Sent to Pharmacy Refills  
 meclizine (ANTIVERT) 25 mg tablet 0 Sig: Take 1 Tab by mouth three (3) times daily as needed for up to 10 days. Class: Normal  
 Pharmacy: [address] #: [phone] Route: Oral  
  
Patient Instructions Medicare Wellness Visit, Male The best way to live healthy is to have a lifestyle where you eat a well-balanced diet, exercise regularly, limit alcohol use, and quit all forms of tobacco/nicotine, if applicable. Regular preventive services are another way to keep healthy. Preventive services (vaccines, screening tests, monitoring & exams) can help personalize your care plan, which helps you manage your own care. Screening tests can find health problems at the earliest stages, when they are easiest to treat. [facility] follows the current, evidence-based guidelines published by the [facility] States Lonnie Otis ([facility]) when recommending preventive services for our patients. Because we follow these guidelines, sometimes recommendations change over time as research supports it.  (For example, a prostate screening blood test is no longer routinely recommended for men with no symptoms.) Of course, you and your doctor may decide to screen more often for some diseases, based on your risk and co-morbidities (chronic disease you are already diagnosed with). Preventive services for you include: - Medicare offers their members a free annual wellness visit, which is time for you and your primary care provider to discuss and plan for your preventive service needs. Take advantage of this benefit every year! 
-All adults over age 72 should receive the recommended pneumonia vaccines. Current USPSTF guidelines recommend a series of two vaccines for the best pneumonia protection.  
-All adults should have a flu vaccine yearly and an ECG. All adults age 61 and older should receive a shingles vaccine once in their lifetime.   
-All adults age 38-68 who are overweight should have a diabetes screening test once every three years.  
-Other screening tests & preventive services for persons with diabetes include: an eye exam to screen for diabetic retinopathy, a kidney function test, a foot exam, and stricter control over your cholesterol.  
-Cardiovascular screening for adults with routine risk involves an electrocardiogram (ECG) at intervals determined by the provider.  
-Colorectal cancer screening should be done for adults age 54-65 with no increased risk factors for colorectal cancer. There are a number of acceptable methods of screening for this type of cancer. Each test has its own benefits and drawbacks. Discuss with your provider what is most appropriate for you during your annual wellness visit. The different tests include: colonoscopy (considered the best screening method), a fecal occult blood test, a fecal DNA test, and sigmoidoscopy. 
-All adults born between [facility] should be screened once for Hepatitis C. 
-An Abdominal Aortic Aneurysm (AAA) Screening is recommended for men age 73-68 who has ever smoked in their lifetime. Here is a list of your current Health Maintenance items (your personalized list of preventive services) with a due date: 
Health Maintenance Due Topic Date Due  Pneumococcal Vaccine (1 of 1 - PPSV23) 09/18/1974  
 DTaP/Tdap/Td  (1 - Tdap) 09/18/1976  Stool testing for trace blood  09/18/2005  Shingles Vaccine  07/18/2015 24 \[facility]\"" Annual Well Visit  03/14/2018  Flu Vaccine  08/01/2018 Introducing [facility]! Dear Consuelo Aguirre: Thank you for requesting a Opeepl account. Our records indicate that you already have an active Opeepl account. You can access your account anytime at [email or web address]. [email or web address] [facility] [employer] Did you know that you can access your hospital and ER discharge instructions at any time in O[employer]pl? You can also review all of your test results from your hospital stay or ER visit. Additional Information If you have questions, please visit the Frequently Asked Questions section of the [employer] website at [email or web address] Remember, O[employer] is NOT to be used for urgent needs. For medical emergencies, dial 911. Now available from your iPhone and Android! Please provide this summary of care documentation to your next provider. Your primary care clinician is listed as Criselda Rachel. If you have any questions after today's visit, please call [phone].

## 2018-08-28 NOTE — PROGRESS NOTES
This is an Initial Medicare Annual Wellness Exam (AWV) (Performed 12 months after IPPE or effective date of Medicare Part B enrollment, Once in a lifetime)    I have reviewed the patient's medical history in detail and updated the computerized patient record. History     Past Medical History:   Diagnosis Date    Bipolar 1 disorder ([facility]     Insomnia       Past Surgical History:   Procedure Laterality Date    HX COLONOSCOPY       Current Outpatient Prescriptions   Medication Sig Dispense Refill    benztropine (COGENTIN) 0.5 mg tablet Take 0.5 mg by mouth two (2) times a day.  meclizine (ANTIVERT) 25 mg tablet Take 1 Tab by mouth three (3) times daily as needed for up to 10 days. 30 Tab 0    risperiDONE (RISPERDAL M-TABS) 1 mg disintegrating tablet Take 1 mg by mouth two (2) times a day.  lithium carbonate SR (LITHOBID) 300 mg CR tablet Take 300 mg by mouth two (2) times a day.  amitriptyline (ELAVIL) 25 mg tablet Take  by mouth nightly. No Known Allergies  Family History   Problem Relation Age of Onset   24 [facility] Mother     Other Father      Worked in a cotton plant and  from lung complications      Social History   Substance Use Topics    Smoking status: Current Every Day Smoker     Packs/day: 0.50     Years: 12.00     Types: Cigarettes    Smokeless tobacco: Never Used    Alcohol use No     Patient Active Problem List   Diagnosis Code    Bipolar 1 disorder (HCC) F31.9    Chronic hepatitis C ([facility] B18.2    GERD (gastroesophageal reflux disease) K21.9    Colon polyps K63.5    Chronic hepatitis B ([facility] B18.1       Depression Risk Factor Screening:     PHQ over the last two weeks 2018   Little interest or pleasure in doing things Several days   Feeling down, depressed, irritable, or hopeless Nearly every day   Total Score PHQ 2 4     Alcohol Risk Factor Screening: You do not drink alcohol or very rarely.     Functional Ability and Level of Safety:     Hearing Loss  Hearing is good. Activities of Daily Living  The home contains: no safety equipment. Patient does total self care    Fall Risk  Fall Risk Assessment, last 12 mths 12/6/2017   Able to walk? Yes   Fall in past 12 months? No       Abuse Screen  Patient is not abused    Cognitive Screening   Evaluation of Cognitive Function:  Has your family/caregiver stated any concerns about your memory: no  Normal, Animal Naming test    Patient Care Team   Patient Care Team:  Wong Flores MD as PCP - General (Family Practice)  Radha Barr MD (Gastroenterology)    Assessment/Plan   Education and counseling provided:  Are appropriate based on today's review and evaluation    Diagnoses and all orders for this visit:    1.  Medicare annual wellness visit, initial         Health Maintenance Due   Topic Date Due    Pneumococcal 19-64 Medium Risk (1 of 1 - PPSV23) 09/18/1974    DTaP/Tdap/Td series (1 - Tdap) 09/18/1976    FOBT Q 1 YEAR AGE 50-75  09/18/2005    ZOSTER VACCINE AGE 60>  07/18/2015    MEDICARE YEARLY EXAM  03/14/2018    Influenza Age 9 to Adult  08/01/2018

## 2018-08-28 NOTE — PROGRESS NOTES
HPI  This is a 60-year-old male who presents to center with concern of dizziness. It began 3-4 days ago. First 2 days he was getting dizzy every time she stood up or if he turned his head. The episodes would only last a minute or less. He denies any head or neck injury. Denies any headache or visual disturbance associated with it. Yesterday and today the episodes have not occurred as long as he stands up and moves slowly. Denies this ever happening in the past.      Past Medical History:   Diagnosis Date    Bipolar 1 disorder ([facility] [address]     Insomnia      . Current Outpatient Prescriptions on File Prior to Visit   Medication Sig Dispense Refill    risperiDONE (RISPERDAL M-TABS) 1 mg disintegrating tablet Take 1 mg by mouth two (2) times a day.  lithium carbonate SR (LITHOBID) 300 mg CR tablet Take 300 mg by mouth two (2) times a day.  amitriptyline (ELAVIL) 25 mg tablet Take  by mouth nightly. No current facility-administered medications on file prior to visit. ROS  Constitutional: Denies fever, chills, body aches or fatigue  Neuro: Denies headache or sensory disturbance  Eyes: Denies visual disturbance  Cardio: Denies chest pain or palpitations  Lungs: Denies shortness of breath  Musculoskeletal: Denies extremity weakness      Objective  Visit Vitals    /60 (BP 1 Location: Left arm, BP Patient Position: Sitting)    Pulse 85    Temp 98.5 °F (36.9 °C) (Oral)    Resp 16    Ht 6' 3\" (1.905 m)    Wt 196 lb (88.9 kg)    SpO2 99%    BMI 24.5 kg/m2       Physical Exam  General: Alert and oriented, well-appearing, no distress  Neuro: Alert oriented ×3, behavior and speech normal  ENT: TMs pearly gray without retraction or bulging  Eyes: PERRLA, EOMs intact  Cardio: Heart regular rate rhythm, no murmur  Lungs: Clear to auscultation bilaterally, equal breath sounds throughout    Diagnoses and all orders for this visit:    1. Dizziness    2.  Medicare annual wellness visit, initial    Other orders  -     meclizine (ANTIVERT) 25 mg tablet; Take 1 Tab by mouth three (3) times daily as needed for up to 10 days. Patient reassured that there is nothing abnormal on exam.  He is started on meclizine and cough for possible drowsy effect of it. As far as a note, there is no interaction between that medication his other medications. I advised him to verify that with pharmacy when he goes to pick the prescription up. Patient is to return to center if no improvement in 2-3 days and instructed to go to the emergency department if symptoms worsen. Questions answered and patient verbalized understanding and agreed with plan.     Ag Peña, PA

## 2018-08-28 NOTE — PATIENT INSTRUCTIONS
Medicare Wellness Visit, Male    The best way to live healthy is to have a lifestyle where you eat a well-balanced diet, exercise regularly, limit alcohol use, and quit all forms of tobacco/nicotine, if applicable. Regular preventive services are another way to keep healthy. Preventive services (vaccines, screening tests, monitoring & exams) can help personalize your care plan, which helps you manage your own care. Screening tests can find health problems at the earliest stages, when they are easiest to treat. [address] [facility] follows the current, evidence-based guidelines published by the [facility] Otis ([facility]) when recommending preventive services for our patients. Because we follow these guidelines, sometimes recommendations change over time as research supports it. (For example, a prostate screening blood test is no longer routinely recommended for men with no symptoms.)  Of course, you and your doctor may decide to screen more often for some diseases, based on your risk and co-morbidities (chronic disease you are already diagnosed with). Preventive services for you include:  - Medicare offers their members a free annual wellness visit, which is time for you and your primary care provider to discuss and plan for your preventive service needs. Take advantage of this benefit every year!  -All adults over age 72 should receive the recommended pneumonia vaccines. Current USPSTF guidelines recommend a series of two vaccines for the best pneumonia protection.   -All adults should have a flu vaccine yearly and an ECG.  All adults age 61 and older should receive a shingles vaccine once in their lifetime.    -All adults age 38-68 who are overweight should have a diabetes screening test once every three years.   -Other screening tests & preventive services for persons with diabetes include: an eye exam to screen for diabetic retinopathy, a kidney function test, a foot exam, and stricter control over your cholesterol.   -Cardiovascular screening for adults with routine risk involves an electrocardiogram (ECG) at intervals determined by the provider.   -Colorectal cancer screening should be done for adults age 54-65 with no increased risk factors for colorectal cancer. There are a number of acceptable methods of screening for this type of cancer. Each test has its own benefits and drawbacks. Discuss with your provider what is most appropriate for you during your annual wellness visit. The different tests include: colonoscopy (considered the best screening method), a fecal occult blood test, a fecal DNA test, and sigmoidoscopy.  -All adults born between [facility] should be screened once for Hepatitis C.  -An Abdominal Aortic Aneurysm (AAA) Screening is recommended for men age 73-68 who has ever smoked in their lifetime.      Here is a list of your current Health Maintenance items (your personalized list of preventive services) with a due date:  Health Maintenance Due   Topic Date Due    Pneumococcal Vaccine (1 of 1 - PPSV23) 09/18/1974    DTaP/Tdap/Td  (1 - Tdap) 09/18/1976    Stool testing for trace blood  09/18/2005    Shingles Vaccine  07/18/2015    Annual Well Visit  03/14/2018    Flu Vaccine  08/01/2018

## 2018-09-17 ENCOUNTER — PATIENT MESSAGE (OUTPATIENT)
Dept: HEMATOLOGY | Age: 63
End: 2018-09-17

## 2019-05-23 ENCOUNTER — HOSPITAL ENCOUNTER (OUTPATIENT)
Dept: LAB | Age: 64
Discharge: HOME | End: 2019-05-23
Payer: MEDICARE

## 2019-05-23 ENCOUNTER — OFFICE VISIT (OUTPATIENT)
Dept: HEMATOLOGY | Age: 64
End: 2019-05-23

## 2019-05-23 VITALS
WEIGHT: 192.38 LBS | OXYGEN SATURATION: 97 % | DIASTOLIC BLOOD PRESSURE: 81 MMHG | HEIGHT: 75 IN | TEMPERATURE: 96 F | BODY MASS INDEX: 23.92 KG/M2 | SYSTOLIC BLOOD PRESSURE: 134 MMHG | HEART RATE: 63 BPM

## 2019-05-23 DIAGNOSIS — B19.10 HEP B W/O COMA: Primary | ICD-10-CM

## 2019-05-23 DIAGNOSIS — B19.10 HEP B W/O COMA: ICD-10-CM

## 2019-05-23 LAB
ALBUMIN SERPL-MCNC: 4.4 G/DL (ref 3.4–5)
ALBUMIN/GLOB SERPL: 1.5 {RATIO} (ref 0.8–1.7)
ALP SERPL-CCNC: 76 U/L (ref 45–117)
ALT SERPL-CCNC: 17 U/L (ref 16–61)
ANION GAP SERPL CALC-SCNC: 6 MMOL/L (ref 3–18)
AST SERPL-CCNC: 24 U/L (ref 15–37)
BASOPHILS # BLD: 0 K/UL (ref 0–0.1)
BASOPHILS NFR BLD: 0 % (ref 0–2)
BILIRUB DIRECT SERPL-MCNC: 0.3 MG/DL (ref 0–0.2)
BILIRUB SERPL-MCNC: 0.8 MG/DL (ref 0.2–1)
BUN SERPL-MCNC: 17 MG/DL (ref 7–18)
BUN/CREAT SERPL: 14 (ref 12–20)
CALCIUM SERPL-MCNC: 9.7 MG/DL (ref 8.5–10.1)
CHLORIDE SERPL-SCNC: 106 MMOL/L (ref 100–108)
CO2 SERPL-SCNC: 31 MMOL/L (ref 21–32)
CREAT SERPL-MCNC: 1.22 MG/DL (ref 0.6–1.3)
DIFFERENTIAL METHOD BLD: ABNORMAL
EOSINOPHIL # BLD: 0 K/UL (ref 0–0.4)
EOSINOPHIL NFR BLD: 0 % (ref 0–5)
ERYTHROCYTE [DISTWIDTH] IN BLOOD BY AUTOMATED COUNT: 14 % (ref 11.6–14.5)
GLOBULIN SER CALC-MCNC: 3 G/DL (ref 2–4)
GLUCOSE SERPL-MCNC: 85 MG/DL (ref 74–99)
HCT VFR BLD AUTO: 43.6 % (ref 36–48)
HGB BLD-MCNC: 14.5 G/DL (ref 13–16)
INR PPP: 1.1 (ref 0.8–1.2)
LYMPHOCYTES # BLD: 1.6 K/UL (ref 0.9–3.6)
LYMPHOCYTES NFR BLD: 36 % (ref 21–52)
MCH RBC QN AUTO: 30.1 PG (ref 24–34)
MCHC RBC AUTO-ENTMCNC: 33.3 G/DL (ref 31–37)
MCV RBC AUTO: 90.5 FL (ref 74–97)
MONOCYTES # BLD: 0.3 K/UL (ref 0.05–1.2)
MONOCYTES NFR BLD: 7 % (ref 3–10)
NEUTS SEG # BLD: 2.5 K/UL (ref 1.8–8)
NEUTS SEG NFR BLD: 57 % (ref 40–73)
PLATELET # BLD AUTO: 166 K/UL (ref 135–420)
PMV BLD AUTO: 11.2 FL (ref 9.2–11.8)
POTASSIUM SERPL-SCNC: 4.9 MMOL/L (ref 3.5–5.5)
PROT SERPL-MCNC: 7.4 G/DL (ref 6.4–8.2)
PROTHROMBIN TIME: 13.9 SEC (ref 11.5–15.2)
RBC # BLD AUTO: 4.82 M/UL (ref 4.7–5.5)
SODIUM SERPL-SCNC: 143 MMOL/L (ref 136–145)
WBC # BLD AUTO: 4.5 K/UL (ref 4.6–13.2)

## 2019-05-23 PROCEDURE — 87517 HEPATITIS B DNA QUANT: CPT

## 2019-05-23 PROCEDURE — 82107 ALPHA-FETOPROTEIN L3: CPT

## 2019-05-23 PROCEDURE — 85025 COMPLETE CBC W/AUTO DIFF WBC: CPT

## 2019-05-23 PROCEDURE — 80048 BASIC METABOLIC PNL TOTAL CA: CPT

## 2019-05-23 PROCEDURE — 87521 HEPATITIS C PROBE&RVRS TRNSC: CPT

## 2019-05-23 PROCEDURE — 36415 COLL VENOUS BLD VENIPUNCTURE: CPT

## 2019-05-23 PROCEDURE — 80076 HEPATIC FUNCTION PANEL: CPT

## 2019-05-23 PROCEDURE — 85610 PROTHROMBIN TIME: CPT

## 2019-05-23 RX ORDER — VALBENAZINE 80 MG/1
80 CAPSULE ORAL DAILY
COMMUNITY
Start: 2019-05-09 | End: 2022-08-22 | Stop reason: ALTCHOICE

## 2019-05-23 NOTE — PROGRESS NOTES
[facility], [place], [address], [employer], [place]      SUZANNA Kirkland, [facility]     April Amber Apotne, STAN Munoz NP [address]    at [facility]    [facility], [address] [place] [address], [facility].    [phone]    FAX: [address] [place] [address] Avenue    at [address], [address], [address]    [phone]    [address] [place] [address] Street: [phone]       Patient Care Team:  Monie Quintanilla MD (Gastroenterology)      Problem List  Date Reviewed: 7/31/2018          Codes Class Noted    Chronic hepatitis B ([facility] [address] ICD-10-CM: B18.1  ICD-9-CM: 070.32  7/26/2014        Bipolar 1 disorder ([facility] [address] ICD-10-CM: F31.9  ICD-9-CM: 296.7  7/21/2014        Chronic hepatitis C ([facility] [address] ICD-10-CM: B18.2  ICD-9-CM: 070.54  7/21/2014        GERD (gastroesophageal reflux disease) ICD-10-CM: K21.9  ICD-9-CM: 530.81  7/21/2014        Colon polyps ICD-10-CM: K63.5  ICD-9-CM: 211.3  7/21/2014              Trish Salgado returns to the [address] for management of Chronic HCV and Chronic HBV. The active problem list, all pertinent past medical history, medications, liver histology, endoscopic studies, radiologic findings and laboratory findings related to the liver disorder were reviewed with the patient. A liver biopsy was performed in 5/2014. This demonstrated no fibrosis. The most recent laboratory studies indicate that the liver transaminases are normal, alkaline phosphatase is normal, tests of hepatic synthetic and metabolic function are normal, and the platelet count is normal.       [facility] has chronic hepatitis B that is inactive with a low viral load and normal transaminases.  [facility] completed 12 weeks of treatment with Celina Darrell in May 2015. He tolerated treatment well and became HCV RNA undetectable at week eight. He remained HCV RNA undetectable one year post treatment. The HCV has been treated and cured. The patient has no complaints which can be attributed to liver disease. The patient completes all daily activities without any functional limitations. The patient has not experienced fatigue, fevers, chills, shortness of breath, chest pain, pain in the right side over the liver, diffuse abdominal pain, nausea, vomiting, constipation, diarrhrea, dry eyes, dry mouth, arthralgias, myalgias, yellowing of the eyes or skin, itching, dark urine, problems concentrating, swelling of the abdomen, swelling of the lower extremities, hematemesis, or hematochezia. Since the last office appointment the patient has:  Had no changes in the liver disease. ALLERGIES  No Known Allergies    MEDICATIONS  Current Outpatient Medications   Medication Sig    risperiDONE (RISPERDAL M-TABS) 1 mg disintegrating tablet Take 1 mg by mouth two (2) times a day.  lithium carbonate SR (LITHOBID) 300 mg CR tablet Take 300 mg by mouth two (2) times a day.  amitriptyline (ELAVIL) 25 mg tablet Take  by mouth nightly.  INGREZZA 80 mg cap Take 80 mg by mouth daily. No current facility-administered medications for this visit. REVIEW OF SYSTEMS NOT RELATED TO LIVER DISEASE:  Constitution systems: Negative for fever, chills, weight gain, weight loss. Eyes: Negative for diplopia, visual changes, eye pain. ENT: Negative for sore throat, painful swallowing. Respiratory: Negative for cough, hemoptysis, dyspnea. Cardiology: Negative for chest pain, palpitations. GI:  Negative for constipation or diarrhea. : Negative for urinary frequency, dysuria and hematuria. Skin: Negative for rash. Hematology: Negative for easy bruising, bleeding from gums or nose. Musculo-skelatal: Negative for back pain, muscle pain, weakness.   Neurologic: Negative for headaches, dizziness, vertigo, memory problems. Psychology: bipolar disease is well controlled. FAMILY HISTORY:  The father  of COPD. The mother  of ovarian cancer. There are no other persons in the family with HCV or HBV or liver disease. SOCIAL HISTORY:  The patient is . He is now in a long term relationship. The partner has not been tested for HCV. The patient has 1 child and 8 grandchildren. The patient currently smokes half pack of tobacco daily. The patient has never consumed significant amounts of alcohol. The patient currently works part time . PHYSICAL EXAMINATION:  Visit Vitals  /81   Pulse 63   Temp 96 °F (35.6 °C) (Tympanic)   Ht 6' 3\" (1.905 m)   Wt 192 lb 6 oz (87.3 kg)   SpO2 97%   BMI 24.05 kg/m²       General: No acute distress. Eyes: Sclera anicteric. ENT: No oral lesions. Thyroid normal.  Nodes: No adenopathy. Skin: No spider angiomata. No jaundice. No palmar erythema. Respiratory: Lungs clear to auscultation. Cardiovascular: Regular heart rate. No murmurs. No JVD. Abdomen: Soft non-tender. Liver size normal to percussion/palpation. Spleen not palpable. No obvious ascites. Extremities: No edema. No muscle wasting. No gross arthritic changes. Neurologic: Alert and oriented. Cranial nerves grossly intact. No asterixsis.     LABORATORY STUDIES:  [facility] [place] [address] Units 2019   WBC 4.6 - 13.2 K/uL 4.5 (L) 3.8 (L)   ANC 1.8 - 8.0 K/UL 2.5 2.3   HGB 13.0 - 16.0 g/dL 14.5 14.5    - 420 K/uL 166 159   INR 0.8 - 1.2   1.1    AST 15 - 37 U/L 24 22   ALT 16 - 61 U/L 17 17   Alk Phos 45 - 117 U/L 76 74   Bili, Total 0.2 - 1.0 MG/DL 0.8 0.5   Bili, Direct 0.0 - 0.2 MG/DL 0.3 (H) 0.2   Albumin 3.4 - 5.0 g/dL 4.4 4.2   BUN 7.0 - 18 MG/DL 17 14   Creat 0.6 - 1.3 MG/DL 1.22 1.20   Na 136 - 145 mmol/L 143 142   K 3.5 - 5.5 mmol/L 4.9 4.7   Cl 100 - 108 mmol/L 106 106 CO2 21 - 32 mmol/L 31 32   Glucose 74 - 99 mg/dL 85 99     Cancer Screening Latest Ref Rng & Units 5/23/2019 7/31/2018 12/6/2017   AFP, Serum 0.0 - 8.0 ng/mL 1.0 1.0 2.7   AFP-L3% 0.0 - 9.9 % Comment Comment 24.5 (H)     SEROLOGIES:  6/2014. HAV total positive, HBsAntigen positive, anti-HBsurface negative, anti-HCV positive, HCV Geneotype 1, HCV RNA Log 5.5 IU/ml, Ferritin 84, iron saturation 25%,   7/2014. HBsurface antigen positive, anti-HBcore positive, anti-HBsurface negative. LIVER HISTOLOGY:  6/2014. Mild-moderate inflammation with no fibrosis. Biopsy slides not reviewed by MLS. 12/2017. TRANSIENT HEPATIC ELASTOGRAPHY:   E Range: 6.70-11.42 kPa  E Mean: 9.28 kPa  E Median: 9.79 kPa  E Std: 2.31 kPa    ENDOSCOPIC PROCEDURES:  2/2014. EGD by Dr Yudy Gonzalez. No varices. Schatzki ring. Normal stomach.  2/2014. Colonoscopy by Dr Yudy Gonzalez. Tubular adenoma. RADIOLOGY:  12/2014: Abdominal ultrasound. Normal appearing liver. No liver mass/lesion noted. 4/2016: Abdominal ultrasound. Normal appearing liver. No liver mass/lesion noted. 9/2016: Abdominal ultrasound. Normal appearing liver. No liver mass/lesion noted. 03/2017. Ultrasound of the liver. Mild hepatomegaly and increased hepatic echogenicity, most suggestive of hepatic steatosis. No suspicious hepatic lesion. 12/2017. Ultrasound of the liver. The liver has mildly increased echotexture diffusely. Hepatomegaly is present with estimated sagittal dimension of the right hepatic lobe measuring 18.1 cm. No focal hepatic lesions are seen. 08/2018. Ultrasound of the liver. Stable hepatomegaly. Stable increased hepatic echotexture, nonspecific and although commonly seen with hepatic steatosis, correlates with history of hepatocellular dysfunction and hepatitis. No focal hepatic lesion is seen. OTHER TESTING:  Not available or performed    ASSESSMENT AND PLAN:  Inactive Hepatitis B.  The most recent laboratory studies indicate that the liver transaminases are normal, alkaline phosphatase is normal, tests of hepatic synthetic and metabolic function are normal, and the platelet count is normal.  Will perform laboratory testing to monitor liver function and degree of liver injury. This will include hepatic panel, a CBC w/ diff, a BMP, an HBV DNA, an AFP-L3%, and an HCV RNA. HCV treatment. Genotype 1. Mr. Haroldo Dey completed 12 weeks of treatment with Isaiah Brunner in May 2015. He remained HCV RNA undetectable one year post treatment. The HCV has been treated and cured. Inactive chronic HBV. No treatment is required at this time. Inactive disease does not need treatment. [address] screening. Recent ultrasound did not suggest emerging HCC. I emphasized to the patient that he need to have ultrasounds completed every 6 months. Early detection of HCC is the key to survival.  Even though he has inactive disease, he is still at increased risk for developing [address] Repeat ultrasound was ordered today. The patient was directed to continue all current medications at the current dosages. The patient was counseled regarding alcohol consumption. Vaccination for viral hepatitis A is not required. The patient has serologic evidence of prior exposure or vaccination with immunity. [facility] in 4 months. All of the above issues were discussed with the patient. All questions were answered. The patient expressed a clear understanding of the above.      Enedina Parkinson NP  [facility] [place] of [address] [place] [address] WEST, [address] [place] [address], [facility] [address]  [phone]

## 2019-05-24 LAB
AFP L3 MFR SERPL: NORMAL % (ref 0–9.9)
AFP SERPL-MCNC: 1 NG/ML (ref 0–8)
HBV DNA SERPL NAA+PROBE-ACNC: NORMAL IU/ML
HBV DNA SERPL NAA+PROBE-LOG IU: NORMAL LOG10 IU/ML
HCV RNA SERPL NAA+PROBE-LOG IU: NOT DETECTED HCV LOG 10IU/ML
HCV RNA SERPL PROBE AMP-ACNC: NOT DETECTED HCVIU/ML
HCV RNA SERPL QL NAA+PROBE: NOT DETECTED
TEST INFORMATION: NORMAL

## 2019-05-28 ENCOUNTER — TELEPHONE (OUTPATIENT)
Dept: HEMATOLOGY | Age: 64
End: 2019-05-28

## 2019-09-19 ENCOUNTER — OFFICE VISIT (OUTPATIENT)
Dept: HEMATOLOGY | Age: 64
End: 2019-09-19

## 2019-09-19 ENCOUNTER — HOSPITAL ENCOUNTER (OUTPATIENT)
Dept: LAB | Age: 64
Discharge: HOME | End: 2019-09-19
Payer: MEDICARE

## 2019-09-19 ENCOUNTER — HOSPITAL ENCOUNTER (OUTPATIENT)
Dept: ULTRASOUND IMAGING | Age: 64
Discharge: HOME | End: 2019-09-19
Payer: MEDICARE

## 2019-09-19 VITALS
HEART RATE: 59 BPM | HEIGHT: 75 IN | OXYGEN SATURATION: 99 % | TEMPERATURE: 97.4 F | DIASTOLIC BLOOD PRESSURE: 65 MMHG | BODY MASS INDEX: 23.13 KG/M2 | RESPIRATION RATE: 16 BRPM | WEIGHT: 186 LBS | SYSTOLIC BLOOD PRESSURE: 121 MMHG

## 2019-09-19 DIAGNOSIS — B19.10 HEP B W/O COMA: Primary | ICD-10-CM

## 2019-09-19 DIAGNOSIS — B19.10 HEP B W/O COMA: ICD-10-CM

## 2019-09-19 DIAGNOSIS — R76.8 HCV ANTIBODY POSITIVE: ICD-10-CM

## 2019-09-19 LAB
ALBUMIN SERPL-MCNC: 4.1 G/DL (ref 3.4–5)
ALBUMIN/GLOB SERPL: 1.4 {RATIO} (ref 0.8–1.7)
ALP SERPL-CCNC: 70 U/L (ref 45–117)
ALT SERPL-CCNC: 16 U/L (ref 16–61)
ANION GAP SERPL CALC-SCNC: 4 MMOL/L (ref 3–18)
AST SERPL-CCNC: 13 U/L (ref 10–38)
BASOPHILS # BLD: 0 K/UL (ref 0–0.1)
BASOPHILS NFR BLD: 0 % (ref 0–2)
BILIRUB DIRECT SERPL-MCNC: 0.2 MG/DL (ref 0–0.2)
BILIRUB SERPL-MCNC: 0.6 MG/DL (ref 0.2–1)
BUN SERPL-MCNC: 14 MG/DL (ref 7–18)
BUN/CREAT SERPL: 13 (ref 12–20)
CALCIUM SERPL-MCNC: 9.3 MG/DL (ref 8.5–10.1)
CHLORIDE SERPL-SCNC: 107 MMOL/L (ref 100–111)
CO2 SERPL-SCNC: 31 MMOL/L (ref 21–32)
CREAT SERPL-MCNC: 1.11 MG/DL (ref 0.6–1.3)
DIFFERENTIAL METHOD BLD: ABNORMAL
EOSINOPHIL # BLD: 0 K/UL (ref 0–0.4)
EOSINOPHIL NFR BLD: 1 % (ref 0–5)
ERYTHROCYTE [DISTWIDTH] IN BLOOD BY AUTOMATED COUNT: 14.6 % (ref 11.6–14.5)
GLOBULIN SER CALC-MCNC: 3 G/DL (ref 2–4)
GLUCOSE SERPL-MCNC: 87 MG/DL (ref 74–99)
HCT VFR BLD AUTO: 43.4 % (ref 36–48)
HGB BLD-MCNC: 14.3 G/DL (ref 13–16)
LYMPHOCYTES # BLD: 1.4 K/UL (ref 0.9–3.6)
LYMPHOCYTES NFR BLD: 36 % (ref 21–52)
MCH RBC QN AUTO: 30.2 PG (ref 24–34)
MCHC RBC AUTO-ENTMCNC: 32.9 G/DL (ref 31–37)
MCV RBC AUTO: 91.8 FL (ref 74–97)
MONOCYTES # BLD: 0.3 K/UL (ref 0.05–1.2)
MONOCYTES NFR BLD: 8 % (ref 3–10)
NEUTS SEG # BLD: 2.2 K/UL (ref 1.8–8)
NEUTS SEG NFR BLD: 55 % (ref 40–73)
PLATELET # BLD AUTO: 157 K/UL (ref 135–420)
PMV BLD AUTO: 11.5 FL (ref 9.2–11.8)
POTASSIUM SERPL-SCNC: 4.6 MMOL/L (ref 3.5–5.5)
PROT SERPL-MCNC: 7.1 G/DL (ref 6.4–8.2)
RBC # BLD AUTO: 4.73 M/UL (ref 4.7–5.5)
SODIUM SERPL-SCNC: 142 MMOL/L (ref 136–145)
WBC # BLD AUTO: 4 K/UL (ref 4.6–13.2)

## 2019-09-19 PROCEDURE — 76981 USE PARENCHYMA: CPT

## 2019-09-19 PROCEDURE — 80076 HEPATIC FUNCTION PANEL: CPT

## 2019-09-19 PROCEDURE — 87517 HEPATITIS B DNA QUANT: CPT

## 2019-09-19 PROCEDURE — 82107 ALPHA-FETOPROTEIN L3: CPT

## 2019-09-19 PROCEDURE — 36415 COLL VENOUS BLD VENIPUNCTURE: CPT

## 2019-09-19 PROCEDURE — 85025 COMPLETE CBC W/AUTO DIFF WBC: CPT

## 2019-09-19 PROCEDURE — 80048 BASIC METABOLIC PNL TOTAL CA: CPT

## 2019-09-19 RX ORDER — MECLIZINE HYDROCHLORIDE 25 MG/1
TABLET ORAL
Refills: 0 | COMMUNITY
Start: 2019-08-05 | End: 2022-08-22

## 2019-09-19 NOTE — PROGRESS NOTES
Pipo Kline is a 59 y.o. male      1. Have you been to the ER, urgent care clinic or hospitalized since your last visit? YES. Patient has been to Urgent care since last visit for vertigo and it has been alleviated. 2. Have you seen or consulted any other health care providers outside of the [address] [place] [address] since your last visit (Include any pap smears or colon screening)?  NO          Learning Assessment 1/22/2015   PRIMARY LEARNER Patient   PRIMARY LANGUAGE ENGLISH   LEARNER PREFERENCE PRIMARY LISTENING   ANSWERED BY patient

## 2019-09-19 NOTE — PROGRESS NOTES
[address] [facility], MD, MD Paul Powell PA-C Jerrol Hamburger, [facility]     April S Annemarie [facility]   Mague Trujillo FNP-JESSI Abebe [facility]       Chantel Loving [address]    at [address], [address] Maddie Salmeron  22.    [phone]    FAX: [address] [place] [address], [address]    [phone]    FAX: [phone]       Patient Care Team:  None as PCP - Miri Adair MD (Gastroenterology)      Problem List  Date Reviewed: 7/31/2018          Codes Class Noted    Chronic [facility] ([facility] ICD-10-CM: B18.1  ICD-9-CM: 070.32  7/26/2014        Bipolar 1 disorder ([facility] ICD-10-CM: F31.9  ICD-9-CM: 296.7  7/21/2014        Chronic hepatitis C ([facility] ICD-10-CM: B18.2  ICD-9-CM: 070.54  7/21/2014        GERD (gastroesophageal reflux disease) ICD-10-CM: K21.9  ICD-9-CM: 530.81  7/21/2014        Colon polyps ICD-10-CM: K63.5  ICD-9-CM: 211.3  7/21/2014              Amando Graff returns to the [address] for management of Chronic HCV and Chronic HBV. The HCV has been treated and cured. The active problem list, all pertinent past medical history, medications, liver histology, endoscopic studies, radiologic findings and laboratory findings related to the liver disorder were reviewed with the patient. A liver biopsy was performed in 5/2014. This demonstrated no fibrosis.       The most recent laboratory studies indicate that the liver transaminases are normal, alkaline phosphatase is normal, tests of hepatic synthetic and metabolic function are normal, and the platelet count is normal.       [facility] has chronic hepatitis B that is inactive with a low viral load and normal transaminases. Mr. Holden Suarez completed 12 weeks of treatment with [place] in May 2015. He tolerated treatment well and became HCV RNA undetectable at week eight. He remained HCV RNA undetectable one 4 years post treatment. The patient has no complaints which can be attributed to liver disease. The patient completes all daily activities without any functional limitations. The patient has not experienced fatigue, fevers, chills, shortness of breath, chest pain, pain in the right side over the liver, diffuse abdominal pain, nausea, vomiting, constipation, diarrhrea, dry eyes, dry mouth, arthralgias, myalgias, yellowing of the eyes or skin, itching, dark urine, problems concentrating, swelling of the abdomen, swelling of the lower extremities, hematemesis, or hematochezia. Since the last office appointment the patient has:  Had no changes in the liver disease. ALLERGIES  No Known Allergies    MEDICATIONS  Current Outpatient Medications   Medication Sig    meclizine (ANTIVERT) 25 mg tablet TAKE 1 TAB BY MOUTH EVERY 8 HOURS AS NEEDED (DIZZINESS).  INGREZZA 80 mg cap Take 80 mg by mouth daily.  risperiDONE (RISPERDAL M-TABS) 1 mg disintegrating tablet Take 1 mg by mouth two (2) times a day.  lithium carbonate SR (LITHOBID) 300 mg CR tablet Take 300 mg by mouth two (2) times a day.  amitriptyline (ELAVIL) 25 mg tablet Take  by mouth nightly. No current facility-administered medications for this visit. REVIEW OF SYSTEMS NOT RELATED TO LIVER DISEASE:  Constitution systems: Negative for fever, chills, weight gain, weight loss. Eyes: Negative for diplopia, visual changes, eye pain. ENT: Negative for sore throat, painful swallowing. Respiratory: Negative for cough, hemoptysis, dyspnea. Cardiology: Negative for chest pain, palpitations. GI:  Negative for constipation or diarrhea. : Negative for urinary frequency, dysuria and hematuria.    Skin: Negative for rash.  Hematology: Negative for easy bruising, bleeding from gums or nose. Musculo-skelatal: Negative for back pain, muscle pain, weakness. Neurologic: Negative for headaches, dizziness, vertigo, memory problems. Psychology: bipolar disease is well controlled. FAMILY HISTORY:  The father  of COPD. The mother  of ovarian cancer. There are no other persons in the family with HCV or HBV or liver disease. SOCIAL HISTORY:  The patient is . He is now in a long term relationship. The partner has not been tested for HCV. The patient has 1 child and 8 grandchildren. The patient currently smokes half pack of tobacco daily. The patient has never consumed significant amounts of alcohol. The patient currently works part time . PHYSICAL EXAMINATION:  Visit Vitals  /65 (BP 1 Location: Right arm, BP Patient Position: Sitting)   Pulse (!) 59   Temp 97.4 °F (36.3 °C) (Tympanic)   Resp 16   Ht 6' 3\" (1.905 m)   Wt 186 lb (84.4 kg)   SpO2 99%   BMI 23.25 kg/m²       General: No acute distress. Eyes: Sclera anicteric. ENT: No oral lesions. Thyroid normal.  Nodes: No adenopathy. Skin: No spider angiomata. No jaundice. No palmar erythema. Respiratory: Lungs clear to auscultation. Cardiovascular: Regular heart rate. No murmurs. No JVD. Abdomen: Soft non-tender. Liver size normal to percussion/palpation. Spleen not palpable. No obvious ascites. Extremities: No edema. No muscle wasting. No gross arthritic changes. Neurologic: Alert and oriented. Cranial nerves grossly intact. No asterixsis.     LABORATORY STUDIES:  Liver [place] of [address] Units 2019   WBC 4.6 - 13.2 K/uL 4.0 (L) 4.5 (L)   ANC 1.8 - 8.0 K/UL 2.2 2.5   HGB 13.0 - 16.0 g/dL 14.3 14.5    - 420 K/uL 157 166   INR 0.8 - 1.2    1.1   AST 10 - 38 U/L 13 24   ALT 16 - 61 U/L 16 17   Alk Phos 45 - 117 U/L 70 76   Bili, Total 0.2 - 1.0 MG/DL 0.6 0.8   Bili, Direct 0.0 - 0.2 MG/DL 0.2 0.3 (H)   Albumin 3.4 - 5.0 g/dL 4.1 4.4   BUN 7.0 - 18 MG/DL 14 17   Creat 0.6 - 1.3 MG/DL 1.11 1.22   Na 136 - 145 mmol/L 142 143   K 3.5 - 5.5 mmol/L 4.6 4.9   Cl 100 - 111 mmol/L 107 106   CO2 21 - 32 mmol/L 31 31   Glucose 74 - 99 mg/dL 87 85     Cancer Screening Latest Ref Rng & Units 9/19/2019 5/23/2019 7/31/2018   AFP, Serum 0.0 - 8.0 ng/mL 1.1 1.0 1.0   AFP-L3% 0.0 - 9.9 % Comment Comment Comment     SEROLOGIES:  6/2014. HAV total positive, HBsAntigen positive, anti-HBsurface negative, anti-HCV positive, HCV Geneotype 1, HCV RNA Log 5.5 IU/ml, Ferritin 84, iron saturation 25%,   7/2014. HBsurface antigen positive, anti-HBcore positive, anti-HBsurface negative. LIVER HISTOLOGY:  6/2014. Mild-moderate inflammation with no fibrosis. Biopsy slides not reviewed by MLS. 12/2017. TRANSIENT HEPATIC ELASTOGRAPHY:   E Range: 6.70-11.42 kPa  E Mean: 9.28 kPa  E Median: 9.79 kPa  E Std: 2.31 kPa    09/2019. TRANSIENT HEPATIC ELASTOGRAPHY:   E Range: 5.4-8.7 kPa (previously 6.7-11. 4)  E Mean: 7.2 kPa (previously 9.3)  E Median: 7.4 kPa (previously 9.8)  E Std: 1.2 kPa (previously 2.3)    ENDOSCOPIC PROCEDURES:  2/2014. EGD by Dr Edmund Núñez. No varices. Schatzki ring. Normal stomach.  2/2014. Colonoscopy by Dr Edmund Núñez. Tubular adenoma. RADIOLOGY:  12/2014: Abdominal ultrasound. Normal appearing liver. No liver mass/lesion noted. 4/2016: Abdominal ultrasound. Normal appearing liver. No liver mass/lesion noted. 9/2016: Abdominal ultrasound. Normal appearing liver. No liver mass/lesion noted. 03/2017. Ultrasound of the liver. Mild hepatomegaly and increased hepatic echogenicity, most suggestive of hepatic steatosis. No suspicious hepatic lesion. 12/2017. Ultrasound of the liver. The liver has mildly increased echotexture diffusely. Hepatomegaly is present with estimated sagittal dimension of the right hepatic lobe measuring 18.1 cm.  No focal hepatic lesions are seen. 08/2018. Ultrasound of the liver. Stable hepatomegaly. Stable increased hepatic echotexture, nonspecific and although commonly seen with hepatic steatosis, correlates with history of hepatocellular dysfunction and hepatitis. No focal hepatic lesion is seen. 09/2019. Ultrasound of the liver. Top normal hepatic size with stable mild heterogeneous increased hepatic parenchymal echotexture prior exams. No focal hepatic mass. OTHER TESTING:  Not available or performed    ASSESSMENT AND PLAN:  Inactive Hepatitis B. The most recent laboratory studies indicate that the liver transaminases are normal, alkaline phosphatase is normal, tests of hepatic synthetic and metabolic function are normal, and the platelet count is normal.  Will perform laboratory testing to monitor liver function and degree of liver injury. This will include hepatic panel, a CBC w/ diff, a BMP, an HBV DNA, an AFP-L3%, and an HCV RNA. HCV treatment. Genotype 1. Mr. Oswald Sanches completed 12 weeks of treatment with Oseas Aguirre in May 2015. He remained HCV RNA undetectable one 4 years post treatment. The HCV has been treated and cured. Inactive chronic HBV. No treatment is required at this time. Inactive disease does not need treatment. [address] screening. Recent ultrasound did not suggest emerging HCC. I emphasized to the patient that he need to have ultrasounds completed every 6 months. Early detection of HCC is the key to survival.  Even though he has inactive disease, he is still at increased risk for developing [address] Repeat ultrasound was ordered today. The patient was directed to continue all current medications at the current dosages. The patient was counseled regarding alcohol consumption. Vaccination for viral hepatitis A is not required. The patient has serologic evidence of prior exposure or vaccination with immunity. [address] in 6 months.      All of the above issues were discussed with the patient. All questions were answered. The patient expressed a clear understanding of the above.      Hayley Cummings NP  Liver [place] of [address] [place] [address], [address] [place] [address] Marisabel Hinkle, [address] [place] [address] - [address]  [phone]

## 2019-09-20 LAB
AFP L3 MFR SERPL: NORMAL % (ref 0–9.9)
AFP SERPL-MCNC: 1.1 NG/ML (ref 0–8)

## 2019-09-21 LAB
HBV DNA SERPL NAA+PROBE-ACNC: NORMAL IU/ML
HBV DNA SERPL NAA+PROBE-LOG IU: NORMAL LOG10 IU/ML
TEST INFORMATION: NORMAL

## 2019-10-08 ENCOUNTER — DOCUMENTATION ONLY (OUTPATIENT)
Dept: HEMATOLOGY | Age: 64
End: 2019-10-08

## 2019-10-08 NOTE — PROGRESS NOTES
Spoke with patient in regards to his ultrasound results. Pt has a clear understanding and will f/u as scheduled.

## 2019-11-04 ENCOUNTER — HOSPITAL ENCOUNTER (OUTPATIENT)
Dept: ULTRASOUND IMAGING | Age: 64
Discharge: HOME | End: 2019-11-04
Payer: MEDICARE

## 2019-11-04 DIAGNOSIS — B19.10 HEP B W/O COMA: ICD-10-CM

## 2019-11-04 PROCEDURE — 76705 ECHO EXAM OF ABDOMEN: CPT

## 2020-05-06 ENCOUNTER — VIRTUAL VISIT (OUTPATIENT)
Dept: HEMATOLOGY | Age: 65
End: 2020-05-06

## 2020-05-06 DIAGNOSIS — B19.10 HEP B W/O COMA: Primary | ICD-10-CM

## 2020-05-06 PROBLEM — Z86.19 HEPATITIS C VIRUS INFECTION CURED AFTER ANTIVIRAL DRUG THERAPY: Status: ACTIVE | Noted: 2020-05-06

## 2020-05-06 NOTE — LETTER
5/6/2020 9:25 AM 
 
Patient:  Liana Rangel YOB: 1955 Date of Visit: 5/6/2020 Dear No Recipients: Thank you for referring Mr. Saji Panda to me for evaluation/treatment. Below are the relevant portions of my assessment and plan of care. If you have questions, please do not hesitate to call me. I look forward to following Mr. Parveen Bassett along with you. Sincerely, Praneeth Matt NP

## 2020-05-06 NOTE — PROGRESS NOTES
Juan M Loyd is a 59 y.o. male evaluated via telephone on 5/6/2020. Consent:  He and/or health care decision maker is aware that he may receive a bill for this telephone service, depending on his insurance coverage, and has provided verbal consent to proceed: Yes      Documentation:  I communicated with the patient and/or health care decision maker about Inactive hepatitis B..   Details of this discussion including any medical advice provided:   Labs and imaging were reviewed. Medications were reviewed. New labs and imaging were ordered. The patient was instructed not to have the newly ordered ultrasound and labs completed until the stay at home order secondary to the [place] 19 pandemic is lifted. I explained that we will perform Fibroscan assessment of hepatic fibrosis when he is next seen in the office. Follow up in 09/2020. I affirm this is a Patient Initiated Episode with a Patient who has not had a related appointment within my department in the past 7 days or scheduled within the next 24 hours.     Total Time: minutes: 11-20 minutes    Note: not billable if this call serves to triage the patient into an appointment for the relevant concern    Azeem Womack, [address], [address] [place] [address]   [phone]

## 2020-05-20 ENCOUNTER — HOSPITAL ENCOUNTER (OUTPATIENT)
Dept: ULTRASOUND IMAGING | Age: 65
Discharge: HOME | End: 2020-05-20
Payer: MEDICARE

## 2020-05-20 DIAGNOSIS — B19.10 HEP B W/O COMA: ICD-10-CM

## 2020-05-20 PROCEDURE — 76705 ECHO EXAM OF ABDOMEN: CPT

## 2020-06-03 LAB
AFP L3 MFR SERPL: NORMAL % (ref 0–9.9)
AFP SERPL-MCNC: 1.3 NG/ML (ref 0–8)
ALBUMIN SERPL-MCNC: 4.8 G/DL (ref 3.8–4.8)
ALP SERPL-CCNC: 73 IU/L (ref 39–117)
ALT SERPL-CCNC: 10 IU/L (ref 0–44)
AST SERPL-CCNC: 18 IU/L (ref 0–40)
BASOPHILS # BLD AUTO: 0 X10E3/UL (ref 0–0.2)
BASOPHILS NFR BLD AUTO: 1 %
BILIRUB DIRECT SERPL-MCNC: 0.23 MG/DL (ref 0–0.4)
BILIRUB SERPL-MCNC: 0.7 MG/DL (ref 0–1.2)
BUN SERPL-MCNC: 14 MG/DL (ref 8–27)
BUN/CREAT SERPL: 11 (ref 10–24)
CALCIUM SERPL-MCNC: 10.1 MG/DL (ref 8.6–10.2)
CHLORIDE SERPL-SCNC: 104 MMOL/L (ref 96–106)
CO2 SERPL-SCNC: 25 MMOL/L (ref 20–29)
CREAT SERPL-MCNC: 1.28 MG/DL (ref 0.76–1.27)
EOSINOPHIL # BLD AUTO: 0.1 X10E3/UL (ref 0–0.4)
EOSINOPHIL NFR BLD AUTO: 2 %
ERYTHROCYTE [DISTWIDTH] IN BLOOD BY AUTOMATED COUNT: 12.8 % (ref 11.6–15.4)
GLUCOSE SERPL-MCNC: 101 MG/DL (ref 65–99)
HBV DNA SERPL NAA+PROBE-ACNC: NORMAL IU/ML
HBV DNA SERPL NAA+PROBE-LOG IU: NORMAL LOG10 IU/ML
HCT VFR BLD AUTO: 45.5 % (ref 37.5–51)
HGB BLD-MCNC: 15.5 G/DL (ref 13–17.7)
IMM GRANULOCYTES # BLD AUTO: 0 X10E3/UL (ref 0–0.1)
IMM GRANULOCYTES NFR BLD AUTO: 0 %
INR PPP: 1.1 (ref 0.8–1.2)
LYMPHOCYTES # BLD AUTO: 1.7 X10E3/UL (ref 0.7–3.1)
LYMPHOCYTES NFR BLD AUTO: 40 %
MCH RBC QN AUTO: 30.2 PG (ref 26.6–33)
MCHC RBC AUTO-ENTMCNC: 34.1 G/DL (ref 31.5–35.7)
MCV RBC AUTO: 89 FL (ref 79–97)
MONOCYTES # BLD AUTO: 0.3 X10E3/UL (ref 0.1–0.9)
MONOCYTES NFR BLD AUTO: 7 %
NEUTROPHILS # BLD AUTO: 2.1 X10E3/UL (ref 1.4–7)
NEUTROPHILS NFR BLD AUTO: 50 %
PLATELET # BLD AUTO: 154 X10E3/UL (ref 150–450)
POTASSIUM SERPL-SCNC: 4.4 MMOL/L (ref 3.5–5.2)
PROT SERPL-MCNC: 7 G/DL (ref 6–8.5)
PROTHROMBIN TIME: 11.3 SEC (ref 9.1–12)
RBC # BLD AUTO: 5.13 X10E6/UL (ref 4.14–5.8)
REF LAB TEST REF RANGE: NORMAL
SODIUM SERPL-SCNC: 146 MMOL/L (ref 134–144)
WBC # BLD AUTO: 4.2 X10E3/UL (ref 3.4–10.8)

## 2020-09-16 ENCOUNTER — VIRTUAL VISIT (OUTPATIENT)
Dept: HEMATOLOGY | Age: 65
End: 2020-09-16

## 2020-09-16 DIAGNOSIS — B19.10 HEP B W/O COMA: Primary | ICD-10-CM

## 2020-09-16 NOTE — PROGRESS NOTES
VIRTUAL TELEHEALTH VISIT PERFORMED DUE TO COVID-19 EPIDEMIC    CONSENT:  Maria Del Carmen Murdock, who was seen by synchronous, real-time, audio-video technology, and/or his healthcare decision maker, is aware that this patient-initiated, Telehealth encounter on 9/16/2020 is a billable service, with coverage as determined by his insurance carrier. He is aware that he may receive a bill and has provided verbal consent to proceed. This patient was evaluated during a Virtual Telehealth visit. A caregiver was present if appropriate.  Due to this being a TeleHealth encounter performed during the ZWPJF-13 public health emergency, the physical examination was limited to that listed in the [address] [place] [address], Cosme CARTER, Betty Solano, Lydia Magyar, MD Remigio Mcburney, SUZANNA Woodall, ACNP-BC     April S [facility], AGPCNP-[facility], [facility] [address], [facility] 136    at [address] 22.    [phone]    FAX: [phone]    at [address], 300 [facility] [address]    [phone]    FAX: [phone]       Patient Care Team:  None as PCP - Shabana Ng MD (Gastroenterology)      Problem List  Date Reviewed: 9/16/2020          Codes Class Noted    Hepatitis C virus infection cured after antiviral drug therapy ICD-10-CM: Z86.19  ICD-9-CM: V12.09  5/6/2020        HCV antibody positive ICD-10-CM: R76.8  ICD-9-CM: 795.79  9/19/2019    Overview Signed 9/19/2019 11:34 AM by Aman Mendoza NP     HCV cured with 12 weeks of Dorothy Civil in May, 2015             Chronic hepatitis B [facility]) ICD-10-CM: B18.1  ICD-9-CM: 070.32  7/26/2014        Bipolar 1 disorder ([facility] ICD-10-CM: F31.9  ICD-9-CM: 296.7  7/21/2014        GERD (gastroesophageal reflux disease) ICD-10-CM: K21.9  ICD-9-CM: 530.81  7/21/2014        Colon polyps ICD-10-CM: K63.5  ICD-9-CM: 211.3  7/21/2014              Fabio Rockwell was seen today via virtual visit at the [address] for management of Chronic HCV and Chronic HBV. The HCV has been treated and cured. The active problem list, all pertinent past medical history, medications, liver histology, endoscopic studies, radiologic findings and laboratory findings related to the liver disorder were reviewed with the patient. A liver biopsy was performed in 5/2014. This demonstrated no fibrosis. The most recent laboratory studies indicate that the liver transaminases are normal, alkaline phosphatase is normal, tests of hepatic synthetic and metabolic function are normal, and the platelet count is normal.      Mr. Alyce Santana has chronic hepatitis B that is inactive with a low viral load and normal transaminases. Mr. Alyce Santana completed 12 weeks of treatment with Stephenie Se in May 2015. He tolerated treatment well and became HCV RNA undetectable at week eight. He remained HCV RNA undetectable 4 years post treatment. The patient has no complaints which can be attributed to liver disease. The patient completes all daily activities without any functional limitations. The patient has not experienced fatigue, fevers, chills, shortness of breath, chest pain, pain in the right side over the liver, diffuse abdominal pain, nausea, vomiting, constipation, diarrhrea, dry eyes, dry mouth, arthralgias, myalgias, yellowing of the eyes or skin, itching, dark urine, problems concentrating, swelling of the abdomen, swelling of the lower extremities, hematemesis, or hematochezia. Since the last office appointment the patient has:  Had no changes in the liver disease.     ALLERGIES  No Known Allergies    MEDICATIONS  Current Outpatient Medications Medication Sig    meclizine (ANTIVERT) 25 mg tablet TAKE 1 TAB BY MOUTH EVERY 8 HOURS AS NEEDED (DIZZINESS).  INGREZZA 80 mg cap Take 80 mg by mouth daily.  risperiDONE (RISPERDAL M-TABS) 1 mg disintegrating tablet Take 1 mg by mouth two (2) times a day.  amitriptyline (ELAVIL) 25 mg tablet Take  by mouth nightly. No current facility-administered medications for this visit. REVIEW OF SYSTEMS NOT RELATED TO LIVER DISEASE:  Constitution systems: Negative for fever, chills, weight gain, weight loss. Eyes: Negative for diplopia, visual changes, eye pain. ENT: Negative for sore throat, painful swallowing. Respiratory: Negative for cough, hemoptysis, dyspnea. Cardiology: Negative for chest pain, palpitations. GI:  Negative for constipation or diarrhea. : Negative for urinary frequency, dysuria and hematuria. Skin: Negative for rash. Hematology: Negative for easy bruising, bleeding from gums or nose. Musculo-skelatal: Negative for back pain, muscle pain, weakness. Neurologic: Negative for headaches, dizziness, vertigo, memory problems. Psychology: bipolar disease is well controlled. FAMILY HISTORY:  The father  of COPD. The mother  of ovarian cancer. There are no other persons in the family with HCV or HBV or liver disease. SOCIAL HISTORY:  The patient is . He is now in a long term relationship. The partner has not been tested for HCV. The patient has 1 child and 8 grandchildren. The patient currently smokes half pack of tobacco daily. The patient has never consumed significant amounts of alcohol. The patient currently works part time . PHYSICAL EXAMINATION:  There were no vitals taken for this visit. General: No acute distress. Eyes: Sclera anicteric. ENT: No oral lesions. Thyroid normal.  Nodes: No adenopathy. Skin: No spider angiomata. No jaundice. No palmar erythema.   Respiratory: Lungs clear to auscultation. Cardiovascular: Regular heart rate. No murmurs. No JVD. Abdomen: Soft non-tender. Liver size normal to percussion/palpation. Spleen not palpable. No obvious ascites. Extremities: No edema. No muscle wasting. No gross arthritic changes. Neurologic: Alert and oriented. Cranial nerves grossly intact. No asterixsis. LABORATORY STUDIES:  Liver [place] of [address] [place] [address] & Units 5/26/2020 9/19/2019   WBC 3.4 - 10.8 x10E3/uL 4.2 4.0 (L)   ANC 1.4 - 7.0 x10E3/uL 2.1 2.2   HGB 13.0 - 17.7 g/dL 15.5 14.3    - 450 x10E3/uL 154 157   INR 0.8 - 1.2 1.1    AST 0 - 40 IU/L 18 13   ALT 0 - 44 IU/L 10 16   Alk Phos 39 - 117 IU/L 73 70   Bili, Total 0.0 - 1.2 mg/dL 0.7 0.6   Bili, Direct 0.00 - 0.40 mg/dL 0.23 0.2   Albumin 3.8 - 4.8 g/dL 4.8 4.1   BUN 8 - 27 mg/dL 14 14   Creat 0.76 - 1.27 mg/dL 1.28 (H) 1.11   Na 134 - 144 mmol/L 146 (H) 142   K 3.5 - 5.2 mmol/L 4.4 4.6   Cl 96 - 106 mmol/L 104 107   CO2 20 - 29 mmol/L 25 31   Glucose 65 - 99 mg/dL 101 (H) 87     Cancer Screening Latest Ref Rng & Units 5/26/2020 9/19/2019 5/23/2019   AFP, Serum 0.0 - 8.0 ng/mL 1.3 1.1 1.0   AFP-L3% 0.0 - 9.9 % Comment Comment Comment     SEROLOGIES:  6/2014. HAV total positive, HBsAntigen positive, anti-HBsurface negative, anti-HCV positive, HCV Geneotype 1, HCV RNA Log 5.5 IU/ml, Ferritin 84, iron saturation 25%,   7/2014. HBsurface antigen positive, anti-HBcore positive, anti-HBsurface negative. LIVER HISTOLOGY:  6/2014. Mild-moderate inflammation with no fibrosis. Biopsy slides not reviewed by MLS. 12/2017. TRANSIENT HEPATIC ELASTOGRAPHY:   E Range: 6.70-11.42 kPa  E Mean: 9.28 kPa  E Median: 9.79 kPa  E Std: 2.31 kPa    09/2019. TRANSIENT HEPATIC ELASTOGRAPHY:   E Range: 5.4-8.7 kPa (previously 6.7-11. 4)  E Mean: 7.2 kPa (previously 9.3)  E Median: 7.4 kPa (previously 9.8)  E Std: 1.2 kPa (previously 2.3)    ENDOSCOPIC PROCEDURES:  2/2014. EGD by Dr Serge Ponce. No varices.   Steven ring.  Normal stomach.  2/2014. Colonoscopy by Dr Rashel Humphrey. Tubular adenoma. RADIOLOGY:  12/2014: Abdominal ultrasound. Normal appearing liver. No liver mass/lesion noted. 4/2016: Abdominal ultrasound. Normal appearing liver. No liver mass/lesion noted. 9/2016: Abdominal ultrasound. Normal appearing liver. No liver mass/lesion noted. 03/2017. Ultrasound of the liver. Mild hepatomegaly and increased hepatic echogenicity, most suggestive of hepatic steatosis. No suspicious hepatic lesion. 12/2017. Ultrasound of the liver. The liver has mildly increased echotexture diffusely. Hepatomegaly is present with estimated sagittal dimension of the right hepatic lobe measuring 18.1 cm. No focal hepatic lesions are seen. 08/2018. Ultrasound of the liver. Stable hepatomegaly. Stable increased hepatic echotexture, nonspecific and although commonly seen with hepatic steatosis, correlates with history of hepatocellular dysfunction and hepatitis. No focal hepatic lesion is seen. 09/2019. Ultrasound of the liver. Top normal hepatic size with stable mild heterogeneous increased hepatic parenchymal echotexture prior exams. No focal hepatic mass. 05/2020. Ultrasound of the liver. Mildly coarsened and heterogeneous in echotexture. No focal mass. OTHER TESTING:  Not available or performed    ASSESSMENT AND PLAN:  Inactive Hepatitis B. The most recent laboratory studies indicate that the liver transaminases are normal, alkaline phosphatase is normal, tests of hepatic synthetic and metabolic function are normal, and the platelet count is normal.  Will perform laboratory testing to monitor liver function and degree of liver injury. This will include hepatic panel, a CBC w/ diff, a BMP, an HBV DNA, an AFP-L3%, and an HCV RNA. HCV treatment. Genotype 1. Mr. Elisabeth Cespedes completed 12 weeks of treatment with Shantal Mireles in May 2015. He remained HCV RNA undetectable one 4 years post treatment.  The HCV has been treated and cured. Inactive chronic HBV. No treatment is required at this time. Inactive disease does not need treatment. [address] screening. Recent ultrasound did not suggest emerging HCC. I emphasized to the patient that he need to have ultrasounds completed every 6 months. Early detection of HCC is the key to survival.  Even though he has inactive disease, he is still at increased risk for developing [address] Repeat ultrasound was ordered today to be performed in 11/2020. The patient was directed to continue all current medications at the current dosages. The patient was counseled regarding alcohol consumption. Vaccination for viral hepatitis A is not required. The patient has serologic evidence of prior exposure or vaccination with immunity. All of the above issues were discussed with the patient. All questions were answered. The patient expressed a clear understanding of the above. FOLLOW-UP AFTER VIRTUAL VISIT:  Pursuant to the emergency declaration under the [facility] waiver authority and the [employer] Act, this Virtual  Visit was conducted, with the patient's (and/or their legal guardian's) consent, to reduce the patient's risk of exposure to COVID-19 and provide necessary medical care. Services were provided through a video synchronous discussion virtually to substitute for an in-person clinic visit. The patient was located in their home. The provider was located in the [place] [address] office. All of the issues listed above in the Assessment and Plan were discussed with the patient. All questions were answered. The patient expressed a clear understanding of the above. [address] in 6 months.      AGUILA Sagastume  Liver [place] [facility]  4 [facility], [address] [place] [address], [address] [place] [address] [phone]

## 2020-10-14 ENCOUNTER — HOSPITAL ENCOUNTER (OUTPATIENT)
Dept: ULTRASOUND IMAGING | Age: 65
Discharge: HOME | End: 2020-10-14
Payer: MEDICARE

## 2020-10-14 ENCOUNTER — HOSPITAL ENCOUNTER (OUTPATIENT)
Dept: LAB | Age: 65
Discharge: HOME | End: 2020-10-14
Payer: MEDICARE

## 2020-10-14 DIAGNOSIS — B19.10 HEP B W/O COMA: ICD-10-CM

## 2020-10-14 LAB
ALBUMIN SERPL-MCNC: 4.4 G/DL (ref 3.4–5)
ALBUMIN/GLOB SERPL: 1.6 {RATIO} (ref 0.8–1.7)
ALP SERPL-CCNC: 63 U/L (ref 45–117)
ALT SERPL-CCNC: 18 U/L (ref 16–61)
ANION GAP SERPL CALC-SCNC: 2 MMOL/L (ref 3–18)
AST SERPL-CCNC: 15 U/L (ref 10–38)
BASOPHILS # BLD: 0 K/UL (ref 0–0.1)
BASOPHILS NFR BLD: 0 % (ref 0–2)
BILIRUB DIRECT SERPL-MCNC: 0.2 MG/DL (ref 0–0.2)
BILIRUB SERPL-MCNC: 0.7 MG/DL (ref 0.2–1)
BUN SERPL-MCNC: 19 MG/DL (ref 7–18)
BUN/CREAT SERPL: 16 (ref 12–20)
CALCIUM SERPL-MCNC: 9.5 MG/DL (ref 8.5–10.1)
CHLORIDE SERPL-SCNC: 109 MMOL/L (ref 100–111)
CO2 SERPL-SCNC: 31 MMOL/L (ref 21–32)
CREAT SERPL-MCNC: 1.19 MG/DL (ref 0.6–1.3)
DIFFERENTIAL METHOD BLD: ABNORMAL
EOSINOPHIL # BLD: 0 K/UL (ref 0–0.4)
EOSINOPHIL NFR BLD: 1 % (ref 0–5)
ERYTHROCYTE [DISTWIDTH] IN BLOOD BY AUTOMATED COUNT: 14 % (ref 11.6–14.5)
GLOBULIN SER CALC-MCNC: 2.8 G/DL (ref 2–4)
GLUCOSE SERPL-MCNC: 91 MG/DL (ref 74–99)
HCT VFR BLD AUTO: 44.6 % (ref 36–48)
HGB BLD-MCNC: 15.2 G/DL (ref 13–16)
LYMPHOCYTES # BLD: 1 K/UL (ref 0.9–3.6)
LYMPHOCYTES NFR BLD: 23 % (ref 21–52)
MCH RBC QN AUTO: 31.2 PG (ref 24–34)
MCHC RBC AUTO-ENTMCNC: 34.1 G/DL (ref 31–37)
MCV RBC AUTO: 91.6 FL (ref 74–97)
MONOCYTES # BLD: 0.4 K/UL (ref 0.05–1.2)
MONOCYTES NFR BLD: 8 % (ref 3–10)
NEUTS SEG # BLD: 2.9 K/UL (ref 1.8–8)
NEUTS SEG NFR BLD: 68 % (ref 40–73)
PLATELET # BLD AUTO: 143 K/UL (ref 135–420)
PMV BLD AUTO: 11.3 FL (ref 9.2–11.8)
POTASSIUM SERPL-SCNC: 4.4 MMOL/L (ref 3.5–5.5)
PROT SERPL-MCNC: 7.2 G/DL (ref 6.4–8.2)
RBC # BLD AUTO: 4.87 M/UL (ref 4.7–5.5)
SODIUM SERPL-SCNC: 142 MMOL/L (ref 136–145)
WBC # BLD AUTO: 4.3 K/UL (ref 4.6–13.2)

## 2020-10-14 PROCEDURE — 80076 HEPATIC FUNCTION PANEL: CPT

## 2020-10-14 PROCEDURE — 82107 ALPHA-FETOPROTEIN L3: CPT

## 2020-10-14 PROCEDURE — 85025 COMPLETE CBC W/AUTO DIFF WBC: CPT

## 2020-10-14 PROCEDURE — 36415 COLL VENOUS BLD VENIPUNCTURE: CPT

## 2020-10-14 PROCEDURE — 87517 HEPATITIS B DNA QUANT: CPT

## 2020-10-14 PROCEDURE — 76705 ECHO EXAM OF ABDOMEN: CPT

## 2020-10-14 PROCEDURE — 80048 BASIC METABOLIC PNL TOTAL CA: CPT

## 2020-10-15 LAB
AFP L3 MFR SERPL: NORMAL % (ref 0–9.9)
AFP SERPL-MCNC: 1.3 NG/ML (ref 0–8)
HBV DNA SERPL NAA+PROBE-ACNC: NORMAL IU/ML
HBV DNA SERPL NAA+PROBE-LOG IU: NORMAL LOG10 IU/ML
TEST INFORMATION: NORMAL

## 2020-10-26 ENCOUNTER — TELEPHONE (OUTPATIENT)
Dept: HEMATOLOGY | Age: 65
End: 2020-10-26

## 2020-11-16 ENCOUNTER — TELEPHONE (OUTPATIENT)
Dept: HEMATOLOGY | Age: 65
End: 2020-11-16

## 2021-03-17 ENCOUNTER — VIRTUAL VISIT (OUTPATIENT)
Dept: HEMATOLOGY | Age: 66
End: 2021-03-17
Payer: MEDICARE

## 2021-03-17 DIAGNOSIS — B19.10 HEP B W/O COMA: Primary | ICD-10-CM

## 2021-03-17 PROCEDURE — 99204 OFFICE O/P NEW MOD 45 MIN: CPT | Performed by: NURSE PRACTITIONER

## 2021-03-17 PROCEDURE — G0463 HOSPITAL OUTPT CLINIC VISIT: HCPCS | Performed by: NURSE PRACTITIONER

## 2021-03-17 NOTE — PROGRESS NOTES
VIRTUAL TELEHEALTH VISIT PERFORMED DUE TO COVID-19 EPIDEMIC    CONSENT:  Aby Tovar, who was seen by synchronous, real-time, audio-video technology, and/or his healthcare decision maker, is aware that this patient-initiated, Telehealth encounter on 3/17/2021 is a billable service, with coverage as determined by his insurance carrier. He is aware that he may receive a bill and has provided verbal consent to proceed. This patient was evaluated during a Virtual Telehealth visit. A caregiver was present if appropriate.  Due to this being a TeleHealth encounter performed during the [place] [address] public health emergency, the physical examination was limited to that listed in the Ivet Padilla MD, [address], [place]ite Kye Solano, MD Vivek Zapata PA-C Jacqui Lord, [facility]     [facility] [address] [facility], [facility]   Meg Rodriguze, [facility] [address], Critical access hospital [address]    at [address].    [phone]    FAX: [address] [place] [address], [address]    [phone]    FAX: [phone]       Patient Care Team:  Adam Mcdowell MD as PCP - General (Family Medicine)  Jennifer Zurita MD (Gastroenterology)      Problem List  Date Reviewed: 3/17/2021          Codes Class Noted    Hepatitis C virus infection cured after antiviral drug therapy ICD-10-CM: Z86.19  ICD-9-CM: V12.09  5/6/2020        HCV antibody positive ICD-10-CM: R76.8  ICD-9-CM: 795.79  9/19/2019    Overview Signed 9/19/2019 11:34 AM by Rhea Norwood NP     HCV cured with 12 weeks of Brigida Bridges in May, 2015             Chronic hepatitis B [facility]) ICD-10-CM: B18.1  ICD-9-CM: 070.32  7/26/2014 Bipolar 1 disorder ([facility] ICD-10-CM: F31.9  ICD-9-CM: 296.7  7/21/2014        GERD (gastroesophageal reflux disease) ICD-10-CM: K21.9  ICD-9-CM: 530.81  7/21/2014        Colon polyps ICD-10-CM: K63.5  ICD-9-CM: 211.3  7/21/2014              Wilfrido Torres was seen today via virtual visit at the [facility] for management of Chronic HCV and Chronic HBV. The HCV has been treated and cured. The active problem list, all pertinent past medical history, medications, liver histology, endoscopic studies, radiologic findings and laboratory findings related to the liver disorder were reviewed with the patient. A liver biopsy was performed in 5/2014. This demonstrated no fibrosis. The most recent laboratory studies indicate that the liver transaminases are normal, alkaline phosphatase is normal, tests of hepatic synthetic and metabolic function are normal, and the platelet count is normal.      Mr. Wilfrid Schilder has chronic hepatitis B that is inactive with a low viral load and normal transaminases. Mr. Wilfrid Schilder completed 12 weeks of treatment with Ceola Manger in May 2015. He tolerated treatment well and became HCV RNA undetectable at week eight. He remained HCV RNA undetectable 4 years post treatment. The patient has no complaints which can be attributed to liver disease. The patient completes all daily activities without any functional limitations. The patient has not experienced fatigue, fevers, chills, shortness of breath, chest pain, pain in the right side over the liver, diffuse abdominal pain, nausea, vomiting, constipation, diarrhrea, dry eyes, dry mouth, arthralgias, myalgias, yellowing of the eyes or skin, itching, dark urine, problems concentrating, swelling of the abdomen, swelling of the lower extremities, hematemesis, or hematochezia. Since the last office appointment the patient has:  Had no changes in the liver disease.     ALLERGIES  No Known Allergies    MEDICATIONS  Current Outpatient Medications   Medication Sig    meclizine (ANTIVERT) 25 mg tablet TAKE 1 TAB BY MOUTH EVERY 8 HOURS AS NEEDED (DIZZINESS).  INGREZZA 80 mg cap Take 80 mg by mouth daily.  risperiDONE (RISPERDAL M-TABS) 1 mg disintegrating tablet Take 1 mg by mouth two (2) times a day.  amitriptyline (ELAVIL) 25 mg tablet Take  by mouth nightly. No current facility-administered medications for this visit. REVIEW OF SYSTEMS NOT RELATED TO LIVER DISEASE:  Constitution systems: Negative for fever, chills, weight gain, weight loss. Eyes: Negative for diplopia, visual changes, eye pain. ENT: Negative for sore throat, painful swallowing. Respiratory: Negative for cough, hemoptysis, dyspnea. Cardiology: Negative for chest pain, palpitations. GI:  Negative for constipation or diarrhea. : Negative for urinary frequency, dysuria and hematuria. Skin: Negative for rash. Hematology: Negative for easy bruising, bleeding from gums or nose. Musculo-skelatal: Negative for back pain, muscle pain, weakness. Neurologic: Negative for headaches, dizziness, vertigo, memory problems. Psychology: bipolar disease is well controlled. FAMILY HISTORY:  The father  of COPD. The mother  of ovarian cancer. There are no other persons in the family with HCV or HBV or liver disease. SOCIAL HISTORY:  The patient is . He is now in a long term relationship. The partner has not been tested for HCV. The patient has 1 child and 8 grandchildren. The patient currently smokes half pack of tobacco daily. The patient has never consumed significant amounts of alcohol. The patient currently works part time . PHYSICAL EXAMINATION PERFORMED BY Andel:  VS: Not performed   General: No acute distress. Eyes: Sclera anicteric. ENT: No oral lesions. Skin: No rashes. spider angiomata. No jaundice.     Abdomen: No obvious distention suggesting ascites. Extremities: No edema. No muscle wasting. Neurologic: Alert and oriented. Cranial nerves grossly intact. LABORATORY STUDIES:  [facility] of [address] [place] [address] Units 10/14/2020 5/26/2020   WBC 4.6 - 13.2 K/uL 4.3 (L) 4.2   ANC 1.8 - 8.0 K/UL 2.9 2.1   HGB 13.0 - 16.0 g/dL 15.2 15.5    - 420 K/uL 143 154   INR 0.8 - 1.2  1.1   AST 10 - 38 U/L 15 18   ALT 16 - 61 U/L 18 10   Alk Phos 45 - 117 U/L 63 73   Bili, Total 0.2 - 1.0 MG/DL 0.7 0.7   Bili, Direct 0.0 - 0.2 MG/DL 0.2 0.23   Albumin 3.4 - 5.0 g/dL 4.4 4.8   BUN 7.0 - 18 MG/DL 19 (H) 14   Creat 0.6 - 1.3 MG/DL 1.19 1.28 (H)   Na 136 - 145 mmol/L 142 146 (H)   K 3.5 - 5.5 mmol/L 4.4 4.4   Cl 100 - 111 mmol/L 109 104   CO2 21 - 32 mmol/L 31 25   Glucose 74 - 99 mg/dL 91 101 (H)     Cancer Screening Latest Ref Rng & Units 10/14/2020 5/26/2020 9/19/2019   AFP, Serum 0.0 - 8.0 ng/mL 1.3 1.3 1.1   AFP-L3% 0.0 - 9.9 % Comment Comment Comment     SEROLOGIES:  6/2014. HAV total positive, HBsAntigen positive, anti-HBsurface negative, anti-HCV positive, HCV Geneotype 1, HCV RNA Log 5.5 IU/ml, Ferritin 84, iron saturation 25%,   7/2014. HBsurface antigen positive, anti-HBcore positive, anti-HBsurface negative. LIVER HISTOLOGY:  6/2014. Mild-moderate inflammation with no fibrosis. Biopsy slides not reviewed by MLS. 12/2017. TRANSIENT HEPATIC ELASTOGRAPHY:   E Range: 6.70-11.42 kPa  E Mean: 9.28 kPa  E Median: 9.79 kPa  E Std: 2.31 kPa    09/2019. TRANSIENT HEPATIC ELASTOGRAPHY:   E Range: 5.4-8.7 kPa (previously 6.7-11. 4)  E Mean: 7.2 kPa (previously 9.3)  E Median: 7.4 kPa (previously 9.8)  E Std: 1.2 kPa (previously 2.3)    ENDOSCOPIC PROCEDURES:  2/2014. EGD by Dr Gee Ash. No varices. Schatzki ring. Normal stomach.  2/2014. Colonoscopy by Dr Gee Ash. Tubular adenoma. RADIOLOGY:  12/2014: Abdominal ultrasound. Normal appearing liver. No liver mass/lesion noted.    4/2016: Abdominal ultrasound. Normal appearing liver. No liver mass/lesion noted. 9/2016: Abdominal ultrasound. Normal appearing liver. No liver mass/lesion noted. 03/2017. Ultrasound of the liver. Mild hepatomegaly and increased hepatic echogenicity, most suggestive of hepatic steatosis. No suspicious hepatic lesion. 12/2017. Ultrasound of the liver. The liver has mildly increased echotexture diffusely. Hepatomegaly is present with estimated sagittal dimension of the right hepatic lobe measuring 18.1 cm. No focal hepatic lesions are seen. 08/2018. Ultrasound of the liver. Stable hepatomegaly. Stable increased hepatic echotexture, nonspecific and although commonly seen with hepatic steatosis, correlates with history of hepatocellular dysfunction and hepatitis. No focal hepatic lesion is seen. 09/2019. Ultrasound of the liver. Top normal hepatic size with stable mild heterogeneous increased hepatic parenchymal echotexture prior exams. No focal hepatic mass. 05/2020. Ultrasound of the liver. Mildly coarsened and heterogeneous in echotexture. No focal mass. 10/2020. Ultrasound of the liver. Increased hepatic echogenicity which is nonspecific but is commonly seen with hepatic steatosis. No hepatic mass. OTHER TESTING:  Not available or performed    ASSESSMENT AND PLAN:  Inactive Hepatitis B. The most recent laboratory studies indicate that the liver transaminases are normal, alkaline phosphatase is normal, tests of hepatic synthetic and metabolic function are normal, and the platelet count is normal.  Will perform laboratory testing to monitor liver function and degree of liver injury. This will include hepatic panel, a CBC w/ diff, a BMP, an HBV DNA, an AFP-L3%, and an HCV RNA. HCV treatment. Genotype 1. Mr. Edilson Mullins completed 12 weeks of treatment with Stephen Mejía in May 2015. He remained HCV RNA undetectable one 4 years post treatment. The HCV has been treated and cured. Inactive chronic HBV.   No treatment is required at this time. Inactive disease does not need treatment. [address] screening. Recent ultrasound did not suggest emerging HCC. I emphasized to the patient that he need to have ultrasounds completed every 6 months. Early detection of HCC is the key to survival.  Even though he has inactive disease, he is still at increased risk for developing [address] Repeat ultrasound was ordered today to be performed in 11/2020. The patient was directed to continue all current medications at the current dosages. The patient was counseled regarding alcohol consumption. Vaccination for viral hepatitis A is not required. The patient has serologic evidence of prior exposure or vaccination with immunity. All of the above issues were discussed with the patient. All questions were answered. The patient expressed a clear understanding of the above. FOLLOW-UP AFTER VIRTUAL VISIT:  Pursuant to the emergency declaration under the [facility], [facility] waiver authority and the [employer] Act, this Virtual  Visit was conducted, with the patient's (and/or their legal guardian's) consent, to reduce the patient's risk of exposure to COVID-19 and provide necessary medical care. Services were provided through a video synchronous discussion virtually to substitute for an in-person clinic visit. The patient was located in their home. The provider was located in the [place] [address] office. All of the issues listed above in the Assessment and Plan were discussed with the patient. All questions were answered. The patient expressed a clear understanding of the above. [address] in 6 months for fibroscan.      AGUILA Hamilton  [facility] [place] [facility] [place]  [facility], [address] [place] [address]   [phone]